# Patient Record
Sex: MALE | Race: WHITE | Employment: OTHER | URBAN - METROPOLITAN AREA
[De-identification: names, ages, dates, MRNs, and addresses within clinical notes are randomized per-mention and may not be internally consistent; named-entity substitution may affect disease eponyms.]

---

## 2018-05-11 ENCOUNTER — HOSPITAL ENCOUNTER (OUTPATIENT)
Dept: PHYSICAL THERAPY | Age: 73
Discharge: HOME | End: 2018-05-11
Payer: COMMERCIAL

## 2018-05-11 PROCEDURE — 97110 THERAPEUTIC EXERCISES: CPT

## 2018-05-11 PROCEDURE — 97162 PT EVAL MOD COMPLEX 30 MIN: CPT

## 2018-05-11 NOTE — PROGRESS NOTES
PT DAILY TREATMENT NOTE -    Patient Name: Helena Nissen  OFAE:[id number]  : 1945  [x]  Patient  Verified  Payor: BLUE CROSS / Plan:  [facility] / Product Type: PPO /    In time:300  Out time:340  Total Treatment Time (min): 40  Visit #: 1 of 8    Treatment Area: Pain in right shoulder [M25.511]    SUBJECTIVE  Pain Level (0-10 scale): 5  Any medication changes, allergies to medications, adverse drug reactions, diagnosis change, or new procedure performed?: [x] No    [] Yes (see summary sheet for update)  Subjective functional status/changes:   [x] See Eval form in paper chart     OBJECTIVE      32 min [x]Eval                  []Re-Eval         8 min Therapeutic Exercise:  [x] See flow sheet :   Rationale: increase ROM, increase strength and improve coordination to improve the patients ability to perform ADLs. With   [] TE   [] TA   [] neuro   [] other: Patient Education: [x] Review HEP    [] Progressed/Changed HEP based on:   [] positioning   [] body mechanics   [] transfers   [] heat/ice application    [] other:           Pain Level (0-10 scale) post treatment: 5    ASSESSMENT:   [x]  See Evaluation          Goals:  Short Term Goals: To be accomplished in 1 weeks:  1. Establish HEP for ROM & Strengthening. Long Term Goals: To be accomplished in 4 weeks:  1. Patient will be independent with HEP for ROM & Strengthening. Eval Status: na  2. Pt will increase right Shoulder PROM flexion to 160 degrees to increase ease with ADLs. Eval Status:130 deg  3. Pt will increase right Shoulder AROM by flexion/IR behind the back to 150 degrees/behind the back thumb to C7 to increase ease with ADLs. Eval Status:119 deg flexion, IR to right hip  4. Pt will increase FOTO score to 65 points to demonstrate improved functional lift & carry.     Eval Status:FOTO: 55    PLAN      [x]  Continue plan of care           Phillip Delgado, PT 2018  4:00 PM

## 2018-05-11 NOTE — PROGRESS NOTES
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 Assessment/ key information: Patient is a 81 y.o.male presenting with Pain in right shoulder [M25.511]. Mr. Mel Leyden presents to initial PT evaluation with c/o worsening right shoulder pain begnining mid April of this year. He reports no exacerbating incident, but instead a gradual worsening of symptoms that impacts sleeping, reaching, lifting, and household activities. He has no pain with touch but is restricted with overhead, across body, and behind the back reaching. Cervical screen negative. RTC testing positive with some positive impingement signs as well. We will work to address ROM limitations with progression of shoulder stability activities as able. Patient will benefit from skilled PT services to address deficits and facilitate return to premorbid activity level and promote improved quality of life.        Evaluation Complexity History MEDIUM  Complexity : 1-2 comorbidities / personal factors will impact the outcome/ POC ; Examination MEDIUM Complexity : 3 Standardized tests and measures addressing body structure, function, activity limitation and / or participation in recreation  ;Presentation MEDIUM Complexity : Evolving with changing characteristics  ; Clinical Decision Making MEDIUM Complexity : FOTO score of 26-74  Overall Complexity Rating: MEDIUM  Problem List: pain affecting function, decrease ROM, decrease strength, edema affecting function, impaired gait/ balance, decrease ADL/ functional abilitiies, decrease activity tolerance, decrease flexibility/ joint mobility and decrease transfer abilities   Treatment Plan may include any combination of the following: Therapeutic exercise, Therapeutic activities, Neuromuscular re-education, Physical agent/modality, Gait/balance training, Manual therapy, Aquatic therapy, Patient education, Self Care training, Functional mobility training, Home safety training and Stair training  Patient / Family readiness to learn indicated by: asking questions, trying to perform skills and interest  Persons(s) to be included in education: patient (P)  Barriers to Learning/Limitations: None  Patient Goal (s): relieve pain.   Patient Self Reported Health Status: good  Rehabilitation Potential: good  Short Term Goals: To be accomplished in 1 weeks:  1. Establish HEP for ROM & Strengthening. Long Term Goals: To be accomplished in 4 weeks:  1. Patient will be independent with HEP for ROM & Strengthening. Eval Status: na  2. Pt will increase right Shoulder PROM flexion to 160 degrees to increase ease with ADLs. Eval Status:130 deg  3. Pt will increase right Shoulder AROM by flexion/IR behind the back to 150 degrees/behind the back thumb to C7 to increase ease with ADLs. Eval Status:119 deg flexion, IR to right hip  4. Pt will increase FOTO score to 65 points to demonstrate improved functional lift & carry. Eval Status:FOTO: 46    Frequency / Duration: Patient to be seen 2 times per week for 4 weeks.     Patient/ Caregiver education and instruction: Diagnosis, prognosis, self care, activity modification and exercises   [x]  Plan of care has been reviewed with NOAM Kahn, PT 5/11/2018 2:54 PM    ________________________________________________________________________    I certify that the above Therapy Services are being furnished while the patient is under my care. I agree with the treatment plan and certify that this therapy is necessary.     Physician's Signature:____________________  Date:____________Time: _________    Please sign and return to [facility]  [address] [place], [address] [phone] fax

## 2018-05-14 ENCOUNTER — HOSPITAL ENCOUNTER (OUTPATIENT)
Dept: PHYSICAL THERAPY | Age: 73
Discharge: HOME | End: 2018-05-14
Payer: COMMERCIAL

## 2018-05-14 PROCEDURE — 97110 THERAPEUTIC EXERCISES: CPT

## 2018-05-14 PROCEDURE — 97140 MANUAL THERAPY 1/> REGIONS: CPT

## 2018-05-14 NOTE — PROGRESS NOTES
PT DAILY TREATMENT NOTE     Patient Name: Lenin Beasley  MHYA:[id number]  : 1945  [x]  Patient  Verified  Payor: BLUE CROSS / Plan: [employer] [facility] / Product Type: PPO /    In time:335  Out time:409  Total Treatment Time (min): 34  Visit #: 2 of 8    Treatment Area: Pain in right shoulder [M25.511]    SUBJECTIVE  Pain Level (0-10 scale): 4  Any medication changes, allergies to medications, adverse drug reactions, diagnosis change, or new procedure performed?: [x] No    [] Yes (see summary sheet for update)  Subjective functional status/changes:   [] No changes reported  \"i had some pain with some of the exercises. \"    OBJECTIVE    Modality rationale: PD   Min Type Additional Details    [] Estim:  []Unatt       []IFC  []Premod                        []Other:  []w/ice   []w/heat  Position:  Location:    [] Estim: []Att    []TENS instruct  []NMES                    []Other:  []w/US   []w/ice   []w/heat  Position:  Location:    []  Traction: [] Cervical       []Lumbar                       [] Prone          []Supine                       []Intermittent   []Continuous Lbs:  [] before manual  [] after manual    []  Ultrasound: []Continuous   [] Pulsed                           []1MHz   []3MHz W/cm2:  Location:    []  Iontophoresis with dexamethasone         Location: [] Take home patch   [] In clinic    []  Ice     []  heat  []  Ice massage  []  Laser   []  Anodyne Position:  Location:    []  Laser with stim  []  Other:  Position:  Location:    []  Vasopneumatic Device Pressure:       [] lo [] med [] hi   Temperature: [] lo [] med [] hi   [] Skin assessment post-treatment:  []intact []redness- no adverse reaction    []redness - adverse reaction:       26 min Therapeutic Exercise:  [x] See flow sheet :   Rationale: increase ROM, increase strength, improve coordination, improve balance and increase proprioception to improve the patients ability to reach.      8 min Manual Therapy:  Gentle PROM right shoulder, s/l scap mobs   Rationale: decrease pain, increase ROM and increase tissue extensibility to improve ROM for ADLs. With   [] TE   [] TA   [] neuro   [] other: Patient Education: [x] Review HEP    [] Progressed/Changed HEP based on:   [] positioning   [] body mechanics   [] transfers   [] heat/ice application    [] other:      Other Objective/Functional Measures:      Pain Level (0-10 scale) post treatment: 2    ASSESSMENT/Changes in Function: Mr. Rob Arnold had some pain with pendulums so advised him to hold off on those for now. Also modified form with some HEP exercises to prevent exacerbation of pain. Stressed at length need to avoid painful ranges of motion. Patient will continue to benefit from skilled PT services to modify and progress therapeutic interventions, address functional mobility deficits, address ROM deficits, address strength deficits, analyze and address soft tissue restrictions, analyze and cue movement patterns, analyze and modify body mechanics/ergonomics, assess and modify postural abnormalities, address imbalance/dizziness and instruct in home and community integration to attain remaining goals. []  See Plan of Care  []  See progress note/recertification  []  See Discharge Summary         Progress towards goals / Updated goals:  Short Term Goals: To be accomplished in 1 weeks:  1. Establish HEP for ROM & Strengthening.     Long Term Goals: To be accomplished in 4 weeks:  1. Patient will be independent with HEP for ROM & Strengthening. Eval Status: na  2. Pt will increase right Shoulder PROM flexion to 160 degrees to increase ease with ADLs. Eval Status:130 deg  3. Pt will increase right Shoulder AROM by flexion/IR behind the back to 150 degrees/behind the back thumb to C7 to increase ease with ADLs. Eval Status:119 deg flexion, IR to right hip  4.  Pt will increase FOTO score to 65 points to demonstrate improved functional lift & carry.                          Eval Status:FOTO: 55    PLAN  []  Upgrade activities as tolerated     [x]  Continue plan of care  []  Update interventions per flow sheet       []  Discharge due to:_  []  Other:_      Jamar Hammer, PT 5/14/2018  3:48 PM    Future Appointments  Date Time Provider Verena Chapman   5/16/2018 2:00 PM [place],  [address]

## 2018-05-16 ENCOUNTER — HOSPITAL ENCOUNTER (OUTPATIENT)
Dept: PHYSICAL THERAPY | Age: 73
Discharge: HOME | End: 2018-05-16
Payer: COMMERCIAL

## 2018-05-16 PROCEDURE — 97110 THERAPEUTIC EXERCISES: CPT | Performed by: PHYSICAL THERAPIST

## 2018-05-16 PROCEDURE — 97140 MANUAL THERAPY 1/> REGIONS: CPT | Performed by: PHYSICAL THERAPIST

## 2018-05-16 NOTE — PROGRESS NOTES
PT DAILY TREATMENT NOTE     Patient Name: Grecia Sellers  Date:2018  : 1945  [x]  Patient  Verified  Payor: BLUE CROSS / Plan: [email or web address] [facility] [place] / Product Type: PPO /    In time:202  Out time:238  Total Treatment Time (min): 36  Visit #: 3 of 8    Treatment Area: Pain in right shoulder [M25.511]    SUBJECTIVE  Pain Level (0-10 scale): 7  Any medication changes, allergies to medications, adverse drug reactions, diagnosis change, or new procedure performed?: [x] No    [] Yes (see summary sheet for update)  Subjective functional status/changes:   [] No changes reported  \"I'm hurting today. I don't know why, there isn't a specific reason. \"    OBJECTIVE    28 min Therapeutic Exercise:  [] See flow sheet :   Rationale: increase ROM, increase strength, improve coordination and increase proprioception to improve the patients ability to tolerate R UE tasks with reduced symptoms. 8 min Manual Therapy:  PROM in all planes to R shoulder with gentle distraction and cues to reduce guarding   Rationale: decrease pain, increase ROM, increase tissue extensibility and increase postural awareness to improve performance of R UE tasks            With   [] TE   [] TA   [] neuro   [] other: Patient Education: [x] Review HEP    [] Progressed/Changed HEP based on:   [] positioning   [] body mechanics   [] transfers   [] heat/ice application    [] other:      Other Objective/Functional Measures: Pt requires increased education regarding pain-free performance of HEP. PT spent extra time reviewing HEP issued at day of evaluation, correcting form, and encouraging patient to remain in pain-free range instead of attempting to match the pictures of the exercises. Pain Level (0-10 scale) post treatment: 7    ASSESSMENT/Changes in Function: Pt declines modalities at end of therapy session today. Pt requires increased time on education regarding pain-free ROM during both therapy and HEP.   Pt with increased guarding noted during PROM; he requires cues to reduce muscle guarding and allow PROM. Patient will continue to benefit from skilled PT services to modify and progress therapeutic interventions, address functional mobility deficits, address ROM deficits, address strength deficits, analyze and address soft tissue restrictions, analyze and cue movement patterns and analyze and modify body mechanics/ergonomics to attain remaining goals. Progress towards goals / Updated goals:  Short Term Goals: To be accomplished in 1 weeks:  1. Establish HEP for ROM & Strengthening.      Long Term Goals: To be accomplished in 4 weeks:  1. Patient will be independent with HEP for ROM & Strengthening.                        Eval Status: na  2. Pt will increase right Shoulder PROM flexion to 160 degrees to increase ease with ADLs.                       Eval Status:130 deg  3.  Pt will increase right Shoulder AROM by flexion/IR behind the back to 150 degrees/behind the back thumb to C7 to increase ease with ADLs.                       Eval Status:119 deg flexion, IR to right hip  4. Pt will increase FOTO score to 65 points to demonstrate improved functional lift & carry.                         Eval Status:FOTO: 46    PLAN  [x]  Upgrade activities as tolerated     [x]  Continue plan of care  []  Update interventions per flow sheet       []  Discharge due to:_  []  Other:_      Nuria Devries, PT 5/16/2018  2:11 PM    No future appointments.

## 2018-05-23 ENCOUNTER — HOSPITAL ENCOUNTER (OUTPATIENT)
Dept: PHYSICAL THERAPY | Age: 73
Discharge: HOME | End: 2018-05-23
Payer: COMMERCIAL

## 2018-05-23 PROCEDURE — 97140 MANUAL THERAPY 1/> REGIONS: CPT

## 2018-05-23 PROCEDURE — 97110 THERAPEUTIC EXERCISES: CPT

## 2018-05-23 NOTE — PROGRESS NOTES
PT DAILY TREATMENT NOTE     Patient Name: Urszula Juarez  Date:2018  : 1945  [x]  Patient  Verified  Payor: BLUE CROSS / Plan: [employer] [facility] / Product Type: PPO /    In time:326  Out time:400  Total Treatment Time (min): 34  Visit #: 4 of 8    Treatment Area: Pain in right shoulder [M25.511]    SUBJECTIVE  Pain Level (0-10 scale): 4  Any medication changes, allergies to medications, adverse drug reactions, diagnosis change, or new procedure performed?: [x] No    [] Yes (see summary sheet for update)  Subjective functional status/changes:   [] No changes reported  \"My shoulder still bothers me. \"    OBJECTIVE    Modality rationale: patient declined   Min Type Additional Details    [] Estim:  []Unatt       []IFC  []Premod                        []Other:  []w/ice   []w/heat  Position:  Location:    [] Estim: []Att    []TENS instruct  []NMES                    []Other:  []w/US   []w/ice   []w/heat  Position:  Location:    []  Traction: [] Cervical       []Lumbar                       [] Prone          []Supine                       []Intermittent   []Continuous Lbs:  [] before manual  [] after manual    []  Ultrasound: []Continuous   [] Pulsed                           []1MHz   []3MHz W/cm2:  Location:    []  Iontophoresis with dexamethasone         Location: [] Take home patch   [] In clinic    []  Ice     []  heat  []  Ice massage  []  Laser   []  Anodyne Position:  Location:    []  Laser with stim  []  Other:  Position:  Location:    []  Vasopneumatic Device Pressure:       [] lo [] med [] hi   Temperature: [] lo [] med [] hi   [] Skin assessment post-treatment:  []intact []redness- no adverse reaction    []redness - adverse reaction:     26 min Therapeutic Exercise:  [x] See flow sheet :   Rationale: increase ROM and increase strength to improve the patients ability to perform ADLs    8 min Manual Therapy:  Sidelying scap mobs to right scapula, STM to Right UT and medial border of scapula, PROM to right GHJ   Rationale: decrease pain, increase ROM, increase tissue extensibility, decrease trigger points and increase postural awareness to improve mobility and reaching        With   [x] TE   [] TA   [] neuro   [] other: Patient Education: [x] Review HEP    [] Progressed/Changed HEP based on:   [x] positioning   [x] body mechanics   [] transfers   [] heat/ice application    [] other:      Other Objective/Functional Measures:   FOTO 55     Pain Level (0-10 scale) post treatment: 3    ASSESSMENT/Changes in Function: Pt is making progress with his passive motion, but remains painful at end range. He is challenged with abduction movements. Patient will continue to benefit from skilled PT services to modify and progress therapeutic interventions, address functional mobility deficits, address ROM deficits, address strength deficits, analyze and address soft tissue restrictions, analyze and cue movement patterns, analyze and modify body mechanics/ergonomics, assess and modify postural abnormalities, address imbalance/dizziness and instruct in home and community integration to attain remaining goals. [x]  See Plan of Care  []  See progress note/recertification  []  See Discharge Summary         Progress towards goals / Updated goals:  Short Term Goals: To be accomplished in 1 weeks:  1. Establish HEP for ROM & Strengthening.     MET  Long Term Goals: To be accomplished in 4 weeks:  1. Patient will be independent with HEP for ROM & Strengthening. Eval Status: na   Reports compliance  2. Pt will increase right Shoulder PROM flexion to 160 degrees to increase ease with ADLs. Eval Status:130 deg   Improving with passive motion  3.  Pt will increase right Shoulder AROM by flexion/IR behind the back to 150 degrees/behind the back thumb to C7 to increase ease with ADLs. Eval Status:119 deg flexion, IR to right hip   Challenged with abduction  4.  Pt will increase FOTO score to 65 points to demonstrate improved functional lift & carry.     Eval Status:FOTO: 86   87    ZQWN  []  Upgrade activities as tolerated     [x]  Continue plan of care  []  Update interventions per flow sheet       []  Discharge due to:_  []  Other:_      Sarah Sr, PTA, CSCS 5/23/2018  4:10 PM    Future Appointments  Date Time Provider Verena Chapman   5/23/2018 3:30 PM Sarah Sr, PTA [facility]   5/24/2018 9:30 AM Killiane Orin, PTA [facility]   5/30/2018 9:30 AM Altonyce Linea, PTA [facility]   6/1/2018 9:30 AM Gianna Guthrie, PT [facility]

## 2018-05-24 ENCOUNTER — HOSPITAL ENCOUNTER (OUTPATIENT)
Dept: PHYSICAL THERAPY | Age: 73
Discharge: HOME | End: 2018-05-24
Payer: COMMERCIAL

## 2018-05-24 PROCEDURE — 97140 MANUAL THERAPY 1/> REGIONS: CPT

## 2018-05-24 PROCEDURE — 97110 THERAPEUTIC EXERCISES: CPT

## 2018-05-24 NOTE — PROGRESS NOTES
PT DAILY TREATMENT NOTE     Patient Name: Blaze Rogers  Date:2018  : 1945  [x]  Patient  Verified  Payor: BLUE CROSS / Plan: [employer] / Product Type: PPO /    In time:930  Out time:1008  Total Treatment Time (min): 38  Visit #: 5 of 8    Treatment Area: Pain in right shoulder [M25.511]    SUBJECTIVE  Pain Level (0-10 scale): 3  Any medication changes, allergies to medications, adverse drug reactions, diagnosis change, or new procedure performed?: [x] No    [] Yes (see summary sheet for update)  Subjective functional status/changes:   [] No changes reported  \"I feel about the same. \"    OBJECTIVE    Modality rationale: patient declined   Min Type Additional Details    [] Estim:  []Unatt       []IFC  []Premod                        []Other:  []w/ice   []w/heat  Position:  Location:    [] Estim: []Att    []TENS instruct  []NMES                    []Other:  []w/US   []w/ice   []w/heat  Position:  Location:    []  Traction: [] Cervical       []Lumbar                       [] Prone          []Supine                       []Intermittent   []Continuous Lbs:  [] before manual  [] after manual    []  Ultrasound: []Continuous   [] Pulsed                           []1MHz   []3MHz W/cm2:  Location:    []  Iontophoresis with dexamethasone         Location: [] Take home patch   [] In clinic    []  Ice     []  heat  []  Ice massage  []  Laser   []  Anodyne Position:  Location:    []  Laser with stim  []  Other:  Position:  Location:    []  Vasopneumatic Device Pressure:       [] lo [] med [] hi   Temperature: [] lo [] med [] hi   [] Skin assessment post-treatment:  []intact []redness- no adverse reaction    []redness - adverse reaction:     30 min Therapeutic Exercise:  [x] See flow sheet :   Rationale: increase ROM and increase strength to improve the patients ability to perform ADLs    8 min Manual Therapy:  Sidelying scap mobs to right scapula, STM to Right UT and medial border of scapula, PROM to right GHJ   Rationale: decrease pain, increase ROM, increase tissue extensibility, decrease trigger points and increase postural awareness to improve mobility and reaching        With   [x] TE   [] TA   [] neuro   [] other: Patient Education: [x] Review HEP    [] Progressed/Changed HEP based on:   [x] positioning   [x] body mechanics   [] transfers   [] heat/ice application    [] other:      Other Objective/Functional Measures:      Pain Level (0-10 scale) post treatment: 3    ASSESSMENT/Changes in Function: Pt is making progress with his motion, but remains painful with overhead activities. Good scapular mobility. Patient will continue to benefit from skilled PT services to modify and progress therapeutic interventions, address functional mobility deficits, address ROM deficits, address strength deficits, analyze and address soft tissue restrictions, analyze and cue movement patterns, analyze and modify body mechanics/ergonomics, assess and modify postural abnormalities, address imbalance/dizziness and instruct in home and community integration to attain remaining goals. [x]  See Plan of Care  []  See progress note/recertification  []  See Discharge Summary         Progress towards goals / Updated goals:  Short Term Goals: To be accomplished in 1 weeks:  1. Establish HEP for ROM & Strengthening. MET  Long Term Goals: To be accomplished in 4 weeks:  1. Patient will be independent with HEP for ROM & Strengthening. Eval Status: na   Reports compliance  2. Pt will increase right Shoulder PROM flexion to 160 degrees to increase ease with ADLs. Eval Status:130 deg   Improving with passive motion  3.  Pt will increase right Shoulder AROM by flexion/IR behind the back to 150 degrees/behind the back thumb to C7 to increase ease with ADLs. Eval Status:119 deg flexion, IR to right hip   Challenged with abduction  4.  Pt will increase FOTO score to 65 points to demonstrate improved functional lift & carry.     Eval Status:FOTO: 50   99    WXEU  []  Upgrade activities as tolerated     [x]  Continue plan of care  []  Update interventions per flow sheet       []  Discharge due to:_  []  Other:_      Marcelino Velazquez PTA, [facility] 5/24/2018  10:11 AM    Future Appointments  Date Time Provider Verena Chapman   5/30/2018 9:30 AM [facility]   6/1/2018 9:30 AM [facility]ovanny Pires [facility]

## 2018-05-30 ENCOUNTER — HOSPITAL ENCOUNTER (OUTPATIENT)
Dept: PHYSICAL THERAPY | Age: 73
Discharge: HOME | End: 2018-05-30
Payer: COMMERCIAL

## 2018-05-30 PROCEDURE — 97110 THERAPEUTIC EXERCISES: CPT

## 2018-05-30 PROCEDURE — 97140 MANUAL THERAPY 1/> REGIONS: CPT

## 2018-05-30 NOTE — PROGRESS NOTES
PT DAILY TREATMENT NOTE     Patient Name: Romeo Fonseca  Date:2018  : 1945  [x]  Patient  Verified  Payor: BLUE CROSS / Plan: [facility] / Product Type: PPO /    In time:930  Out time:1003  Total Treatment Time (min): 33  Visit #: 6 of 8    Treatment Area: Pain in right shoulder [M25.511]    SUBJECTIVE  Pain Level (0-10 scale): 5  Any medication changes, allergies to medications, adverse drug reactions, diagnosis change, or new procedure performed?: [x] No    [] Yes (see summary sheet for update)  Subjective functional status/changes:   [] No changes reported  \"It hurts like a headache type of pain. \"    OBJECTIVE    Modality rationale: patient declined   Min Type Additional Details    [] Estim:  []Unatt       []IFC  []Premod                        []Other:  []w/ice   []w/heat  Position:  Location:    [] Estim: []Att    []TENS instruct  []NMES                    []Other:  []w/US   []w/ice   []w/heat  Position:  Location:    []  Traction: [] Cervical       []Lumbar                       [] Prone          []Supine                       []Intermittent   []Continuous Lbs:  [] before manual  [] after manual    []  Ultrasound: []Continuous   [] Pulsed                           []1MHz   []3MHz W/cm2:  Location:    []  Iontophoresis with dexamethasone         Location: [] Take home patch   [] In clinic    []  Ice     []  heat  []  Ice massage  []  Laser   []  Anodyne Position:  Location:    []  Laser with stim  []  Other:  Position:  Location:    []  Vasopneumatic Device Pressure:       [] lo [] med [] hi   Temperature: [] lo [] med [] hi   [] Skin assessment post-treatment:  []intact []redness- no adverse reaction    []redness - adverse reaction:     25 min Therapeutic Exercise:  [x] See flow sheet :   Rationale: increase ROM and increase strength to improve the patients ability to perform ADLs    8 min Manual Therapy:  Sidelying scap mobs to right scapula, STM to Right UT and medial border of scapula, PROM to right GHJ   Rationale: decrease pain, increase ROM, increase tissue extensibility, decrease trigger points and increase postural awareness to improve mobility and reaching        With   [x] TE   [] TA   [x] neuro   [] other: Patient Education: [x] Review HEP    [] Progressed/Changed HEP based on:   [x] positioning   [x] body mechanics   [] transfers   [] heat/ice application    [] other:      Other Objective/Functional Measures:      Pain Level (0-10 scale) post treatment: 3-4    ASSESSMENT/Changes in Function: Pt is progressing with his motion, but his pain continues to fluctuate currently. Patient will continue to benefit from skilled PT services to modify and progress therapeutic interventions, address functional mobility deficits, address ROM deficits, address strength deficits, analyze and address soft tissue restrictions, analyze and cue movement patterns, analyze and modify body mechanics/ergonomics, assess and modify postural abnormalities, address imbalance/dizziness and instruct in home and community integration to attain remaining goals. [x]  See Plan of Care  []  See progress note/recertification  []  See Discharge Summary         Progress towards goals / Updated goals:  Short Term Goals: To be accomplished in 1 weeks:  1. Establish HEP for ROM & Strengthening. MET  Long Term Goals: To be accomplished in 4 weeks:  1. Patient will be independent with HEP for ROM & Strengthening. Eval Status: na   Reports compliance  2. Pt will increase right Shoulder PROM flexion to 160 degrees to increase ease with ADLs. Eval Status:130 deg   Improving with passive motion  3.  Pt will increase right Shoulder AROM by flexion/IR behind the back to 150 degrees/behind the back thumb to C7 to increase ease with ADLs. Eval Status:119 deg flexion, IR to right hip   Challenged with abduction  4. Pt will increase FOTO score to 65 points to demonstrate improved functional lift & carry. Eval Status:FOTO: 19   94    OJSS  []  Upgrade activities as tolerated     [x]  Continue plan of care  []  Update interventions per flow sheet       []  Discharge due to:_  []  Other:_      Peggy Davis PTA, [facility] 5/30/2018  10:21 AM    Future Appointments  Date Time Provider Verena Chapman   6/1/2018 9:30 AM Mickey Hensley PT [facility]   6/5/2018 9:30 AM Mickey Hensley PT [facility]   6/7/2018 9:30 AM Peggy Davis PTA [facility]

## 2018-06-05 ENCOUNTER — HOSPITAL ENCOUNTER (OUTPATIENT)
Dept: PHYSICAL THERAPY | Age: 73
Discharge: HOME | End: 2018-06-05
Payer: COMMERCIAL

## 2018-06-05 PROCEDURE — 97110 THERAPEUTIC EXERCISES: CPT | Performed by: PHYSICAL THERAPIST

## 2018-06-05 PROCEDURE — 97140 MANUAL THERAPY 1/> REGIONS: CPT | Performed by: PHYSICAL THERAPIST

## 2018-06-05 NOTE — PROGRESS NOTES
PT DAILY TREATMENT NOTE     Patient Name: Shadia Chawla  Date:2018  : 1945  [x]  Patient  Verified  Payor: BLUE CROSS / Plan: [employer] [facility] / Product Type: PPO /    In time:928  Out time:1005  Total Treatment Time (min): 37  Visit #: 7 of 8    Treatment Area: Pain in right shoulder [M25.511]    SUBJECTIVE  Pain Level (0-10 scale): 6  Any medication changes, allergies to medications, adverse drug reactions, diagnosis change, or new procedure performed?: [x] No    [] Yes (see summary sheet for update)  Subjective functional status/changes:   [] No changes reported  \"This is the worst I've felt in a while    OBJECTIVE    29 min Therapeutic Exercise:  [] See flow sheet :   Rationale: increase ROM, increase strength, improve coordination and increase proprioception to improve the patients ability to tolerate R shoulder activity with reduced pain. 8 min Manual Therapy:  PROM in supine in all planes to R shoulder; S/L scapulothoracic joint mobilizations   Rationale: decrease pain, increase ROM, increase tissue extensibility and decrease trigger points to improve tolerance of R UE activity. With   [] TE   [] TA   [] neuro   [] other: Patient Education: [x] Review HEP    [] Progressed/Changed HEP based on:   [] positioning   [] body mechanics   [] transfers   [] heat/ice application    [] other:      Other Objective/Functional Measures: Pt notes pain mid-range today during PROM/AAROM tasks. Pt given cues for positioning during standing shoulder flexion tasks and TRX rows. Pain Level (0-10 scale) post treatment: 5    ASSESSMENT/Changes in Function: Pt reports that his shoulder is slightly more sore today; he is not sure why, but notes using an [employer] patch last night. Pt notes pain mid-range in the shoulder during PROM/AAROM tasks. Pt tolerates session with slight reduction in pain at end of therapy; he denies need for modalities.     Patient will continue to benefit from skilled PT services to modify and progress therapeutic interventions, address functional mobility deficits, address ROM deficits, address strength deficits, analyze and address soft tissue restrictions, analyze and cue movement patterns and analyze and modify body mechanics/ergonomics to attain remaining goals. Progress towards goals / Updated goals:  Short Term Goals: To be accomplished in 1 weeks:  1. Establish HEP for ROM & Strengthening. MET  Long Term Goals: To be accomplished in 4 weeks:  1. Patient will be independent with HEP for ROM & Strengthening. Eval Status: na                        Reports compliance  2. Pt will increase right Shoulder PROM flexion to 160 degrees to increase ease with ADLs. Eval Status:130 deg                        Improving with passive motion  3.  Pt will increase right Shoulder AROM by flexion/IR behind the back to 150 degrees/behind the back thumb to C7 to increase ease with ADLs. Eval Status:119 deg flexion, IR to right hip                        Challenged with abduction  4. Pt will increase FOTO score to 65 points to demonstrate improved functional lift & carry.                          Eval Status:FOTO: 55                        55    PLAN  [x]  Upgrade activities as tolerated     [x]  Continue plan of care  []  Update interventions per flow sheet       []  Discharge due to:_  []  Other:_      Gianna Guthrie, PT 6/5/2018  9:30 AM    Future Appointments  Date Time Provider Verena Chapman   6/7/2018 9:30 AM Sarah Sr PTA [facility]

## 2018-06-07 ENCOUNTER — HOSPITAL ENCOUNTER (OUTPATIENT)
Dept: PHYSICAL THERAPY | Age: 73
Discharge: HOME | End: 2018-06-07
Payer: COMMERCIAL

## 2018-06-07 PROCEDURE — 97110 THERAPEUTIC EXERCISES: CPT

## 2018-06-07 NOTE — PROGRESS NOTES
PT DAILY TREATMENT NOTE     Patient Name: Nathan Durham  Date:2018  : 1945  [x]  Patient  Verified  Payor: BLUE CROSS / Plan: [employer] [facility] / Product Type: PPO /    In time:910  Out time:943  Total Treatment Time (min): 33  Total Timed Codes (min): 33  1:1 Treatment Time (min): 33  Visit #: 8 of 8    Treatment Area: Pain in right shoulder [M25.511]    SUBJECTIVE  Pain Level (0-10 scale): 5  Any medication changes, allergies to medications, adverse drug reactions, diagnosis change, or new procedure performed?: [x] No    [] Yes (see summary sheet for update)  Subjective functional status/changes:   [] No changes reported  \"The pain has been rough since Monday. \"    OBJECTIVE    Modality rationale: patient declined   Min Type Additional Details    [] Estim:  []Unatt       []IFC  []Premod                        []Other:  []w/ice   []w/heat  Position:  Location:    [] Estim: []Att    []TENS instruct  []NMES                    []Other:  []w/US   []w/ice   []w/heat  Position:  Location:    []  Traction: [] Cervical       []Lumbar                       [] Prone          []Supine                       []Intermittent   []Continuous Lbs:  [] before manual  [] after manual    []  Ultrasound: []Continuous   [] Pulsed                           []1MHz   []3MHz W/cm2:  Location:    []  Iontophoresis with dexamethasone         Location: [] Take home patch   [] In clinic    []  Ice     []  heat  []  Ice massage  []  Laser   []  Anodyne Position:  Location:    []  Laser with stim  []  Other:  Position:  Location:    []  Vasopneumatic Device Pressure:       [] lo [] med [] hi   Temperature: [] lo [] med [] hi   [] Skin assessment post-treatment:  []intact []redness- no adverse reaction    []redness - adverse reaction:     33 min Therapeutic Exercise:  [x] See flow sheet :   Rationale: increase ROM and increase strength to improve the patients ability to perform ADLs        With   [x] TE   [] TA   [x] neuro   [] other: Patient Education: [x] Review HEP    [] Progressed/Changed HEP based on:   [x] positioning   [x] body mechanics   [] transfers   [] heat/ice application    [] other:      Other Objective/Functional Measures:   FOTO 59  PROM flexion right shoulder 130 deg  AROM flexion right shoulder 120 deg     Pain Level (0-10 scale) post treatment: 4    ASSESSMENT/Changes in Function: Mr. Kin Belle has been a pleasure to treat and reports 0% improvement since beginning therapy. Pt is making minimal progress with his pain relief and ROM. He reports pain at rest, lifting, sleeping, and is limited with reaching across his body and behind his self. Able to IR to his right hip. We are discharging to an updated HEP as patient doesn't feel as though he is improving and wishes to follow up with his MD.    Patient will continue to benefit from skilled PT services to modify and progress therapeutic interventions, address functional mobility deficits, address ROM deficits, address strength deficits, analyze and address soft tissue restrictions, analyze and cue movement patterns, analyze and modify body mechanics/ergonomics, assess and modify postural abnormalities, address imbalance/dizziness and instruct in home and community integration to attain remaining goals. []  See Plan of Care  []  See progress note/recertification  [x]  See Discharge Summary         Progress towards goals / Updated goals:  Short Term Goals: To be accomplished in 1 weeks:  1. Establish HEP for ROM & Strengthening. MET  Long Term Goals: To be accomplished in 4 weeks:  1. Patient will be independent with HEP for ROM & Strengthening. Eval Status: na   MET  2. Pt will increase right Shoulder PROM flexion to 160 degrees to increase ease with ADLs. Eval Status:130 deg   MET; 130 deg  3.  Pt will increase right Shoulder AROM by flexion/IR behind the back to 150 degrees/behind the back thumb to C7 to increase ease with ADLs.    Eval Status:119 deg flexion, IR to right hip   NOT MET; 120 deg flexion, IR to right hip  4. Pt will increase FOTO score to 65 points to demonstrate improved functional lift & carry. Eval Status:FOTO: 46   NOT MET; 59    Functional Gains: none to report  Functional Deficits: lifting, full ROM, reaching across body, reaching behind self, pain at rest, sleeping  % improvement: 0%  Pain   Average: 5/10       Best: 4/10     Worst: 9/10  Patient Goal: \"get rid of the pain. \"    PLAN  []  Upgrade activities as tolerated     []  Continue plan of care  []  Update interventions per flow sheet       [x]  Discharge due to: PROGRAM COMPLETE  []  Other:_      Peggy Davis PTA, [facility] 6/7/2018  9:51 AM    Future Appointments  Date Time Provider Verena Chapman   6/7/2018 9:30 AM Peggy Davis PTA [facility]   6/12/2018 9:00 AM Snader Hwang, JOEL [facility]   6/14/2018 9:00 AM Sander Hwang PT [facility]

## 2018-06-12 NOTE — PROGRESS NOTES
[facility] [place] [address]  [address], [address] [phone] fax    Discharge Summary  Patient name: Maliha Cody Start of Care: 2018   Referral source: Shira Wilcox MD : 1945                         Medical Diagnosis: Pain in right shoulder [M25.511] Onset Date:                         Treatment Diagnosis: right shoulder pain   Prior Hospitalization: see medical history Provider#: [id number]   Medications: Verified on Patient summary List    Comorbidities: neuropathy in B hands/feet, OA, HTN   Prior Level of Function: retired, functionally independent    Visits from Start of Care: 8    Missed Visits: 0    Reporting Period : 18 to 18    [address] [place] [address] Avenue be accomplished in 1 weeks:  1. Establish HEP for ROM & Strengthening. MET  Long Term Goals: To be accomplished in 4 weeks:  1. Patient will be independent with HEP for ROM & Strengthening. Eval Status: na                        MET  2. Pt will increase right Shoulder PROM flexion to 160 degrees to increase ease with ADLs. Eval Status:130 deg                        MET; 160 deg  3.  Pt will increase right Shoulder AROM by flexion/IR behind the back to 150 degrees/behind the back thumb to C7 to increase ease with ADLs. Eval Status:119 deg flexion, IR to right hip                        NOT MET; 120 deg flexion, IR to right hip  4. Pt will increase FOTO score to 65 points to demonstrate improved functional lift & carry.                          Eval Status:FOTO: 46                        NOT MET; 59     Functional Gains: none to report  Functional Deficits: lifting, full ROM, reaching across body, reaching behind self, pain at rest, sleeping  % improvement: 0%  Pain   Average: 5/10                            Best: 4/10                          Worst: 9/10  Patient Goal: \"get rid of the pain. \"     Assessment/Summary of care: Mr. Alfredo Daly has made minimal progress with his pain relief and ROM in therapy over the past 4 weeks. He reports pain at rest, lifting, sleeping, and is limited with reaching across his body and behind his self.   As pain continues to limit him functionally and progress towards goals has been limited, recommend discharge from outpatient PT services with f/u with MD.     RECOMMENDATIONS:  [x]Discontinue therapy: []Patient has reached or is progressing toward set goals      []Patient is non-compliant or has abdicated      [x]Due to lack of appreciable progress towards set goals    Sha Zeng, PT 6/12/2018 2:25 PM

## 2018-08-07 ENCOUNTER — HOSPITAL ENCOUNTER (OUTPATIENT)
Dept: PHYSICAL THERAPY | Age: 73
Discharge: HOME | End: 2018-08-07
Payer: COMMERCIAL

## 2018-08-07 PROCEDURE — 97161 PT EVAL LOW COMPLEX 20 MIN: CPT | Performed by: PHYSICAL THERAPIST

## 2018-08-07 PROCEDURE — 97140 MANUAL THERAPY 1/> REGIONS: CPT | Performed by: PHYSICAL THERAPIST

## 2018-08-07 PROCEDURE — 97110 THERAPEUTIC EXERCISES: CPT | Performed by: PHYSICAL THERAPIST

## 2018-08-07 NOTE — PROGRESS NOTES
PT DAILY TREATMENT NOTE     Patient Name: Sherwin Bush  WLVL:[id number]  : 1945  [x]  Patient  Verified  Payor: BLUE CROSS / Plan: [facility] / Product Type: PPO /    In time:  Out time:1155  Total Treatment Time (min): 44  Visit #: 1 of 10    Treatment Area: Adhesive capsulitis of right shoulder [M75.01]    SUBJECTIVE  Pain Level (0-10 scale): 3  Any medication changes, allergies to medications, adverse drug reactions, diagnosis change, or new procedure performed?: [x] No    [] Yes (see summary sheet for update)  Subjective functional status/changes:   [] No changes reported  See eval.    OBJECTIVE    10 min [x]Eval                  []Re-Eval       34 min Therapeutic Exercise:  [] See flow sheet :   Rationale: increase ROM and increase proprioception to improve the patients ability to tolerate daily tasks with reduced R shoulder pain and improved mobility. With   [x] TE   [] TA   [] neuro   [] other: Patient Education: [x] Review HEP    [] Progressed/Changed HEP based on:   [] positioning   [] body mechanics   [] transfers   [] heat/ice application    [] other:      Other Objective/Functional Measures: See eval.     Pain Level (0-10 scale) post treatment: 2    ASSESSMENT/Changes in Function:   [x]  See Plan of Care    Progress towards goals / Updated goals:  Short Term Goals: To be accomplished in 2 weeks:  1. Pt to report [place] with HEP. Eval status: HEP issued and reviewed physically/verbally with pt    Long Term Goals: To be accomplished in 4 weeks:  1. Pt to report score of 72 on FOTO, indicating improved tolerance to activity and reduced pain. Eval status: 63 on initial FOTO  2.   Pt to demonstrate full R shoulder AROM/PROM at 160 degrees flexion/abduction, 75 degrees ER (90/90), able to reach C7 with IR behind the back  Eval status: R shoulder AROM 110 degrees flexion, 116 degrees abduction, 68 degrees ER (90/90) with substitutions, able to reach to sacrum with behind the back IR  3. Pt to be able to perform overhead and upper body hygiene/dressing tasks without increased c/o pain or difficulty.   Eval status: limited by pain and reduced ROM    PLAN  []  Upgrade activities as tolerated     [x]  Continue plan of care  []  Update interventions per flow sheet       []  Discharge due to:_  []  Other:_      Ryan Klein, PT 8/7/2018  12:19 PM    Future Appointments  Date Time Provider Verena Chapman   8/14/2018 2:00 PM Ailyn Hatch [facility]   8/16/2018 2:30 PM Arnulfo Torres PTA [facility]   8/21/2018 2:00 PM JOEL Sims [facility]   8/23/2018 2:30 PM Arnulfo Torres PTA [facility]   8/28/2018 2:30 PM Ryan Klein PT [facility]   8/30/2018 2:00 PM Arnulfo Torres PTA [facility]

## 2018-08-07 NOTE — PROGRESS NOTES
[facility] - [place] [address]  [address], [address] [phone] fax    Plan of Care/ Statement of Necessity for Physical Therapy Services           Patient name: Guanakito Stevenson Start of Care: 2018     Referral source: Kris Green MD : 1945                         Medical Diagnosis: Pain in right shoulder [M25.511] Onset Date:mid 2018                         Treatment Diagnosis: right shoulder pain   Prior Hospitalization: see medical history Provider#: [id number]   Medications: Verified on Patient summary List    Comorbidities: neuropathy in B hands/feet, OA, HTN   Prior Level of Function: retired, functionally independent  [address] and following information is based on the information from the initial evaluation. Assessment/ key information: Patient is a 81 y.o.male presenting with Pain in right shoulder [M25.511]. Pt previously attended therapy in May 2018 with c/o worsening R shoulder pain that is now slightly improving at this evaluation. He notes that his symptoms began in 2018 without specific cause. presents to initial PT evaluation with c/o worsening right shoulder pain begnining mid April of this year. Pt notes difficulty with dressing and reaching behind the back. He notes that he does have low level pain at rest as well. Pt demonstrates reduced AROM/PROM of the R shoulder, but is able to exhibit 5/5 strength in testing positions with MMT. Pt would benefit from skilled PT intervention to address the above limitations and return him to full PLOF.   Evaluation Complexity History MEDIUM  Complexity : 1-2 comorbidities / personal factors will impact the outcome/ POC ; Examination MEDIUM Complexity : 3 Standardized tests and measures addressing body structure, function, activity limitation and / or participation in recreation  ;Presentation MEDIUM Complexity : Evolving with changing characteristics ;Clinical Decision Making MEDIUM Complexity : FOTO score of 26-74  Overall Complexity Rating: LOW  Problem List: pain affecting function, decrease ROM, decrease strength, edema affecting function, impaired gait/ balance, decrease ADL/ functional abilitiies, decrease activity tolerance, decrease flexibility/ joint mobility and decrease transfer abilities                         Treatment Plan may include any combination of the following: Therapeutic exercise, Therapeutic activities, Neuromuscular re-education, Physical agent/modality, Gait/balance training, Manual therapy, Aquatic therapy, Patient education, Self Care training, Functional mobility training, Home safety training and Stair training  Patient / Family readiness to learn indicated by: asking questions, trying to perform skills and interest  Persons(s) to be included in education: patient (P)  Barriers to Learning/Limitations: None  Patient Goal (s): get rid of pain  Patient Self Reported Health Status: good  Rehabilitation Potential: good  Short Term Goals: To be accomplished in 2 weeks:  1. Pt to report [place] with HEP. Eval status: HEP issued and reviewed physically/verbally with pt    Long Term Goals: To be accomplished in 4 weeks:  1. Pt to report score of 72 on FOTO, indicating improved tolerance to activity and reduced pain. Eval status: 63 on initial FOTO  2. Pt to demonstrate full R shoulder AROM/PROM at 160 degrees flexion/abduction, 75 degrees ER (90/90), able to reach C7 with IR behind the back  Eval status: R shoulder AROM 110 degrees flexion, 116 degrees abduction, 68 degrees ER (90/90) with substitutions, able to reach to sacrum with behind the back IR  3. Pt to be able to perform overhead and upper body hygiene/dressing tasks without increased c/o pain or difficulty. Eval status: limited by pain and reduced ROM  Frequency / Duration: Patient to be seen 2 times per week for 10 treatments.     Patient/ Caregiver education and instruction: Diagnosis, prognosis, activity modification and exercises   [x]  Plan of care has been reviewed with NOAM Dykes, JOEL 8/7/2018 11:18 AM    ________________________________________________________________________    I certify that the above Therapy Services are being furnished while the patient is under my care. I agree with the treatment plan and certify that this therapy is necessary.     Physician's Signature:____________Date:_________TIME:________    ** Signature, Date and Time must be completed for valid certification **    Please sign and return to [facility] [place] [address]  [address] [place], [address] [phone] fax

## 2018-08-09 ENCOUNTER — APPOINTMENT (OUTPATIENT)
Dept: PHYSICAL THERAPY | Age: 73
End: 2018-08-09
Payer: COMMERCIAL

## 2018-08-14 ENCOUNTER — HOSPITAL ENCOUNTER (OUTPATIENT)
Dept: PHYSICAL THERAPY | Age: 73
Discharge: HOME | End: 2018-08-14
Payer: COMMERCIAL

## 2018-08-14 PROCEDURE — 97112 NEUROMUSCULAR REEDUCATION: CPT | Performed by: PHYSICAL THERAPIST

## 2018-08-14 PROCEDURE — 97110 THERAPEUTIC EXERCISES: CPT | Performed by: PHYSICAL THERAPIST

## 2018-08-14 PROCEDURE — 97140 MANUAL THERAPY 1/> REGIONS: CPT | Performed by: PHYSICAL THERAPIST

## 2018-08-14 NOTE — PROGRESS NOTES
PT DAILY TREATMENT NOTE     Patient Name: Jayro Nicolas  Date:2018  : 1945  [x]  Patient  Verified  Payor: BLUE CROSS / Plan: [employer] [facility] [place] / Product Type: PPO /    In time:203  Out time:256  Total Treatment Time (min): 53  Visit #: 2 of 10    Treatment Area: Adhesive capsulitis of right shoulder [M75.01]    SUBJECTIVE  Pain Level (0-10 scale): 3  Any medication changes, allergies to medications, adverse drug reactions, diagnosis change, or new procedure performed?: [x] No    [] Yes (see summary sheet for update)  Subjective functional status/changes:   [] No changes reported  \"I was sore this weekend. I'm not sure why. \"    OBJECTIVE    35 min Therapeutic Exercise:  [x] See flow sheet :   Rationale: increase ROM, increase strength, improve coordination and increase proprioception to improve the patients ability to tolerate daily activities with improved mobility/stability and reduced R shoulder pain. 10 min Neuromuscular Re-education:  [x]  See flow sheet :   Rationale: increase ROM, increase strength, improve coordination and increase proprioception  to improve the patients ability to tolerate daily activities with improved mobility/stability and reduced R shoulder pain. 8 min Manual Therapy:  PROM in all planes to endrange, gentle distraction to shoulder joint. Rationale: decrease pain, increase ROM and increase tissue extensibility to tolerate daily activities with improved mobility/stability and reduced R shoulder pain. With   [x] TE   [] TA   [x] neuro   [] other: Patient Education: [x] Review HEP    [] Progressed/Changed HEP based on:   [] positioning   [] body mechanics   [] transfers   [] heat/ice application    [] other:      Other Objective/Functional Measures: Pt requires visual and verbal cues for initiation of POC/new exercises.      Pain Level (0-10 scale) post treatment: 2    ASSESSMENT/Changes in Function: Pt notes slight reduction in pain after treatment today. He notes benefit from exercises and PROM tasks today and declines modalities post treatment. Patient will continue to benefit from skilled PT services to modify and progress therapeutic interventions, address functional mobility deficits, address ROM deficits, address strength deficits, analyze and address soft tissue restrictions, analyze and cue movement patterns, analyze and modify body mechanics/ergonomics and assess and modify postural abnormalities to attain remaining goals. Progress towards goals / Updated goals:  Short Term Goals: To be accomplished in 2 weeks:  1. Pt to report [place] with HEP. Eval status: HEP issued and reviewed physically/verbally with pt     Long Term Goals: To be accomplished in 4 weeks:  1. Pt to report score of 72 on FOTO, indicating improved tolerance to activity and reduced pain. Eval status: 63 on initial FOTO  2. Pt to demonstrate full R shoulder AROM/PROM at 160 degrees flexion/abduction, 75 degrees ER (90/90), able to reach C7 with IR behind the back  Eval status: R shoulder AROM 110 degrees flexion, 116 degrees abduction, 68 degrees ER (90/90) with substitutions, able to reach to sacrum with behind the back IR  3. Pt to be able to perform overhead and upper body hygiene/dressing tasks without increased c/o pain or difficulty.   Eval status: limited by pain and reduced ROM    PLAN  []  Upgrade activities as tolerated     [x]  Continue plan of care  []  Update interventions per flow sheet       []  Discharge due to:_  []  Other:_      Hernan Dwyer, PT 8/14/2018  3:00 PM    Future Appointments  Date Time Provider Verena Chapman   8/21/2018 2:00 PM [facility]   8/23/2018 2:30 PM [facility]   8/28/2018 2:30 PM [facility]   8/30/2018 2:00 PM [facility]

## 2018-08-16 ENCOUNTER — APPOINTMENT (OUTPATIENT)
Dept: PHYSICAL THERAPY | Age: 73
End: 2018-08-16
Payer: COMMERCIAL

## 2018-08-21 ENCOUNTER — HOSPITAL ENCOUNTER (OUTPATIENT)
Dept: PHYSICAL THERAPY | Age: 73
Discharge: HOME | End: 2018-08-21
Payer: COMMERCIAL

## 2018-08-21 PROCEDURE — 97112 NEUROMUSCULAR REEDUCATION: CPT

## 2018-08-21 PROCEDURE — 97140 MANUAL THERAPY 1/> REGIONS: CPT

## 2018-08-21 PROCEDURE — 97110 THERAPEUTIC EXERCISES: CPT

## 2018-08-21 NOTE — PROGRESS NOTES
PT DAILY TREATMENT NOTE     Patient Name: Dylan Hopkins  Date:2018  : 1945  [x]  Patient  Verified  Payor: BLUE CROSS / Plan: [employer] [facility] / Product Type: PPO /    In time:200  Out time:251  Total Treatment Time (min): 51  Visit #: 3 of 10    Treatment Area: Adhesive capsulitis of right shoulder [M75.01]    SUBJECTIVE  Pain Level (0-10 scale): 2  Any medication changes, allergies to medications, adverse drug reactions, diagnosis change, or new procedure performed?: [x] No    [] Yes (see summary sheet for update)  Subjective functional status/changes:   [x] No changes reported    OBJECTIVE    18 min Therapeutic Exercise:  [x] See flow sheet :   Rationale: increase ROM and increase strength to improve the patients ability to perform ADL's.    25 min Neuromuscular Re-education:  [x]  See flow sheet :   Rationale: increase ROM, increase strength, improve coordination and increase proprioception  to improve the patients ability to perform functional tasks. 8 min Manual Therapy:  PROM in all planes to endrange, gentle distraction to shoulder joint. Rationale: decrease pain, increase ROM, decrease trigger points and increase postural awareness to improve quality of life. With   [x] TE   [] TA   [] neuro   [] other: Patient Education: [x] Review HEP    [] Progressed/Changed HEP based on:   [] positioning   [x] body mechanics   [] transfers   [] heat/ice application    [] other:      Other Objective/Functional Measures: right shoulder flexion AROM 0-145 deg    Pain Level (0-10 scale) post treatment: 2    ASSESSMENT/Changes in Function: Pt requires cuing for postural corrections with overhead activities. He demonstrates weakness in scapular muscles and tightness in pectoralis major and latissimus dorsi.       Patient will continue to benefit from skilled PT services to modify and progress therapeutic interventions, address functional mobility deficits, address ROM deficits, address strength deficits, analyze and address soft tissue restrictions, analyze and cue movement patterns, analyze and modify body mechanics/ergonomics and assess and modify postural abnormalities to attain remaining goals. [x]  See Plan of Care  []  See progress note/recertification  []  See Discharge Summary         Progress towards goals / Updated goals:  Short Term Goals: To be accomplished in 2 weeks:  1.  Pt to report [place] with HEP. Eval status: HEP issued and reviewed physically/verbally with pt      Long Term Goals: To be accomplished in 4 weeks: 1.    Pt to report score of 72 on FOTO, indicating improved tolerance to activity and reduced pain.                        Eval status: 63 on initial FOTO  2.  Pt to demonstrate full R shoulder AROM/PROM at 160 degrees flexion/abduction, 75 degrees ER (90/90), able to reach C7 with IR behind the back  Eval status: R shoulder AROM 110 degrees flexion, 116 degrees abduction, 68 degrees ER (90/90) with substitutions, able to reach to sacrum with behind the back IR  Currently: right shoulder flexion AROM 0-145 deg  3.  Pt to be able to perform overhead and upper body hygiene/dressing tasks without increased c/o pain or difficulty.   Eval status: limited by pain and reduced ROM    PLAN  []  Upgrade activities as tolerated     [x]  Continue plan of care  []  Update interventions per flow sheet       []  Discharge due to:_  []  Other:_      Akankshaon Lydia 8/21/2018  2:10 PM    Future Appointments  Date Time Provider Verena Chapman   8/23/2018 2:30 PM Octavia Solo PTA [facility]   8/28/2018 2:30 PM [place] [facility]   8/30/2018 2:00 PM Octavia Solo PTA [facility]

## 2018-08-23 ENCOUNTER — HOSPITAL ENCOUNTER (OUTPATIENT)
Dept: PHYSICAL THERAPY | Age: 73
Discharge: HOME | End: 2018-08-23
Payer: COMMERCIAL

## 2018-08-23 PROCEDURE — 97112 NEUROMUSCULAR REEDUCATION: CPT

## 2018-08-23 PROCEDURE — 97110 THERAPEUTIC EXERCISES: CPT

## 2018-08-23 PROCEDURE — 97140 MANUAL THERAPY 1/> REGIONS: CPT

## 2018-08-23 NOTE — PROGRESS NOTES
PT DAILY TREATMENT NOTE - [facility]     Patient Name: Kamini Ivy  Date:2018  : 1945  [x]  Patient  Verified  Payor: BLUE CROSS / Plan:  [facility] [place] / Product Type: PPO /    In time:230  Out time:308  Total Treatment Time (min): 38  Visit #: 4 of 10    Treatment Area: Adhesive capsulitis of right shoulder [M75.01]    SUBJECTIVE  Pain Level (0-10 scale): 2  Any medication changes, allergies to medications, adverse drug reactions, diagnosis change, or new procedure performed?: [x] No    [] Yes (see summary sheet for update)  Subjective functional status/changes:   [] No changes reported  \"I feel better than I did. \"    OBJECTIVE    Modality rationale: patient declined   Min Type Additional Details    [] Estim:  []Unatt       []IFC  []Premod                        []Other:  []w/ice   []w/heat  Position:  Location:    [] Estim: []Att    []TENS instruct  []NMES                    []Other:  []w/US   []w/ice   []w/heat  Position:  Location:    []  Traction: [] Cervical       []Lumbar                       [] Prone          []Supine                       []Intermittent   []Continuous Lbs:  [] before manual  [] after manual    []  Ultrasound: []Continuous   [] Pulsed                           []1MHz   []3MHz W/cm2:  Location:    []  Iontophoresis with dexamethasone         Location: [] Take home patch   [] In clinic    []  Ice     []  heat  []  Ice massage  []  Laser   []  Anodyne Position:  Location:    []  Laser with stim  []  Other:  Position:  Location:    []  Vasopneumatic Device Pressure:       [] lo [] med [] hi   Temperature: [] lo [] med [] hi   [] Skin assessment post-treatment:  []intact []redness- no adverse reaction    []redness - adverse reaction:     15 min Therapeutic Exercise:  [x] See flow sheet :   Rationale: increase ROM and increase strength to improve the patients ability to perform ADLs    15 min Neuromuscular Re-education:  [x]  See flow sheet : scap re-ed   Rationale: increase ROM, increase strength, improve coordination, improve balance and increase proprioception  to improve the patients ability to improve mobility and reaching    8 min Manual Therapy:  Sidelying scap mobs to right scapula, PROM with gentle mobs to for shoulder elevation on the right shoulder   Rationale: decrease pain, increase ROM, increase tissue extensibility, decrease trigger points and increase postural awareness to improve mobility and reaching        With   [x] TE   [] TA   [x] neuro   [] other: Patient Education: [x] Review HEP    [] Progressed/Changed HEP based on:   [x] positioning   [x] body mechanics   [] transfers   [] heat/ice application    [] other:      Other Objective/Functional Measures:      Pain Level (0-10 scale) post treatment: 2    ASSESSMENT/Changes in Function: Pt had improved passive motion today. Decreased pain at end ranges. He reports improvements with pain, but still has difficulty with IR. Patient will continue to benefit from skilled PT services to modify and progress therapeutic interventions, address functional mobility deficits, address ROM deficits, address strength deficits, analyze and address soft tissue restrictions, analyze and cue movement patterns, analyze and modify body mechanics/ergonomics, assess and modify postural abnormalities, address imbalance/dizziness and instruct in home and community integration to attain remaining goals. [x]  See Plan of Care  []  See progress note/recertification  []  See Discharge Summary         Progress towards goals / Updated goals:  Short Term Goals: To be accomplished in 2 weeks:  1. Pt to report [place] with HEP. Eval status: HEP issued and reviewed physically/verbally with pt    MET  Long Term Goals: To be accomplished in 4 weeks:  1. Pt to report score of 72 on FOTO, indicating improved tolerance to activity and reduced pain. Eval status: 63 on initial FOTO   Assess at NV  2.   Pt to demonstrate full R shoulder AROM/PROM at 160 degrees flexion/abduction, 75 degrees ER (90/90), able to reach C7 with IR behind the back   Eval status: R shoulder AROM 110 degrees flexion, 116 degrees abduction, 68 degrees ER (90/90) with substitutions, able to reach to sacrum with behind the back IR   Improving with passive motion  3. Pt to be able to perform overhead and upper body hygiene/dressing tasks without increased c/o pain or difficulty.    Eval status: limited by pain and reduced ROM   Improving     PLAN  []  Upgrade activities as tolerated     [x]  Continue plan of care  []  Update interventions per flow sheet       []  Discharge due to:_  []  Other:_      Alberto Howard PTA, CSCS 8/23/2018  3:39 PM    Future Appointments  Date Time Provider Verena Chapman   8/23/2018 2:30 PM Alberto Howard PTA [facility]   8/28/2018 2:30 PM [facility] [place] [place] [facility]   8/30/2018 2:00 PM Alberto Howard PTA [facility]

## 2018-08-28 ENCOUNTER — HOSPITAL ENCOUNTER (OUTPATIENT)
Dept: PHYSICAL THERAPY | Age: 73
Discharge: HOME | End: 2018-08-28
Payer: COMMERCIAL

## 2018-08-28 PROCEDURE — 97140 MANUAL THERAPY 1/> REGIONS: CPT | Performed by: PHYSICAL THERAPIST

## 2018-08-28 PROCEDURE — 97110 THERAPEUTIC EXERCISES: CPT | Performed by: PHYSICAL THERAPIST

## 2018-08-28 NOTE — PROGRESS NOTES
PT DAILY TREATMENT NOTE     Patient Name: Negrita Mejía  Date:2018  : 1945  [x]  Patient  Verified  Payor: BLUE CROSS / Plan: [employer] [facility] [place] / Product Type: PPO /    In time:225  Out time:303  Total Treatment Time (min): 38  Visit #: 5 of 10    Treatment Area: Adhesive capsulitis of right shoulder [M75.01]    SUBJECTIVE  Pain Level (0-10 scale): 1  Any medication changes, allergies to medications, adverse drug reactions, diagnosis change, or new procedure performed?: [x] No    [] Yes (see summary sheet for update)  Subjective functional status/changes:   [x] No changes reported    OBJECTIVE     30 min Therapeutic Exercise:  [x] See flow sheet :   Rationale: increase ROM, increase strength, improve coordination and increase proprioception to improve the patients ability to tolerate daily activities with improved mobility/stability and reduced R shoulder pain.     8 min Manual Therapy:  PROM in all planes to endrange, gentle distraction to shoulder joint. Rationale: decrease pain, increase ROM and increase tissue extensibility to tolerate daily activities with improved mobility/stability and reduced R shoulder pain.      With   [x] TE   [] TA   [x] neuro   [] other: Patient Education: [x] Review HEP    [] Progressed/Changed HEP based on:   [] positioning   [] body mechanics   [] transfers   [] heat/ice application    [] other:       Other Objective/Functional Measures: supine AROM shoulder flexion 130     Pain Level (0-10 scale) post treatment: 0     ASSESSMENT/Changes in Function: Pt demonstrates improving motion; still requires occasional cues for body mechanics during standing AROM tasks.   Supine AROM shoulder flexion improved to 130 today.     Patient will continue to benefit from skilled PT services to modify and progress therapeutic interventions, address functional mobility deficits, address ROM deficits, address strength deficits, analyze and address soft tissue restrictions, analyze and cue movement patterns, analyze and modify body mechanics/ergonomics and assess and modify postural abnormalities to attain remaining goals.     Progress towards goals / Updated goals:  Short Term Goals: To be accomplished in 2 weeks:  1.  Pt to report [place] with HEP. Eval status: HEP issued and reviewed physically/verbally with pt                        MET  Long Term Goals: To be accomplished in 4 weeks: 1.    Pt to report score of 72 on FOTO, indicating improved tolerance to activity and reduced pain. Eval status: 63 on initial FOTO                        Assess at NV  2.  Pt to demonstrate full R shoulder AROM/PROM at 160 degrees flexion/abduction, 75 degrees ER (90/90), able to reach C7 with IR behind the back                        Eval status: R shoulder AROM 110 degrees flexion, 116 degrees abduction, 68 degrees ER (90/90) with substitutions, able to reach to sacrum with behind the back IR                        Progressing: R shoulder AROM 130 degrees flexion  3.  Pt to be able to perform overhead and upper body hygiene/dressing tasks without increased c/o pain or difficulty.                         Eval status: limited by pain and reduced ROM                        Improving         PLAN  []  Upgrade activities as tolerated     [x]  Continue plan of care  []  Update interventions per flow sheet       []  Discharge due to:_  []  Other:_      Froilan Torres PT 8/28/2018  3:45 PM    Future Appointments  Date Time Provider Verena Chapman   8/30/2018 2:00 PM Akil Odonnell PTA [facility]   9/5/2018 2:00 PM Ailyn [place] [facility]   9/7/2018 2:00 [facility]   9/11/2018 3:30 PM [facility]   9/13/2018 3:00 PM Akil Odonnell PTA [facility]

## 2018-08-30 ENCOUNTER — HOSPITAL ENCOUNTER (OUTPATIENT)
Dept: PHYSICAL THERAPY | Age: 73
Discharge: HOME | End: 2018-08-30
Payer: COMMERCIAL

## 2018-08-30 PROCEDURE — 97110 THERAPEUTIC EXERCISES: CPT

## 2018-08-30 PROCEDURE — 97112 NEUROMUSCULAR REEDUCATION: CPT

## 2018-08-30 NOTE — PROGRESS NOTES
PT DAILY TREATMENT NOTE     Patient Name: Jesi Hand  Date:2018  : 1945  [x]  Patient  Verified  Payor: BLUE CROSS / Plan: [facility] / Product Type: PPO /    In time:200  Out time:235  Total Treatment Time (min): 35  Visit #: 6 of 10    Treatment Area: Adhesive capsulitis of right shoulder [M75.01]    SUBJECTIVE  Pain Level (0-10 scale): 0  Any medication changes, allergies to medications, adverse drug reactions, diagnosis change, or new procedure performed?: [x] No    [] Yes (see summary sheet for update)  Subjective functional status/changes:   [] No changes reported  \"No pain. \"    OBJECTIVE    Modality rationale: patient declined   Min Type Additional Details    [] Estim:  []Unatt       []IFC  []Premod                        []Other:  []w/ice   []w/heat  Position:  Location:    [] Estim: []Att    []TENS instruct  []NMES                    []Other:  []w/US   []w/ice   []w/heat  Position:  Location:    []  Traction: [] Cervical       []Lumbar                       [] Prone          []Supine                       []Intermittent   []Continuous Lbs:  [] before manual  [] after manual    []  Ultrasound: []Continuous   [] Pulsed                           []1MHz   []3MHz W/cm2:  Location:    []  Iontophoresis with dexamethasone         Location: [] Take home patch   [] In clinic    []  Ice     []  heat  []  Ice massage  []  Laser   []  Anodyne Position:  Location:    []  Laser with stim  []  Other:  Position:  Location:    []  Vasopneumatic Device Pressure:       [] lo [] med [] hi   Temperature: [] lo [] med [] hi   [] Skin assessment post-treatment:  []intact []redness- no adverse reaction    []redness - adverse reaction:     10 min Therapeutic Exercise:  [x] See flow sheet :   Rationale: increase ROM and increase strength to improve the patients ability to perform ADLs    17 min Neuromuscular Re-education:  [x]  See flow sheet : scap re-ed activities   Rationale: increase ROM, increase strength, improve coordination, improve balance and increase proprioception  to improve the patients ability to improve mobility and reaching    8 min Manual Therapy:  Sidelying scap mobs to right scapula, PROM with gentle mobs to for shoulder elevation on the right shoulder   Rationale: decrease pain, increase ROM, increase tissue extensibility, decrease trigger points and increase postural awareness to improve mobility and reaching        With   [x] TE   [] TA   [x] neuro   [] other: Patient Education: [x] Review HEP    [] Progressed/Changed HEP based on:   [x] positioning   [x] body mechanics   [] transfers   [] heat/ice application    [] other:      Other Objective/Functional Measures:      Pain Level (0-10 scale) post treatment: 1    ASSESSMENT/Changes in Function: Pt continues to improve with functional mobility, strength, ROM, and pain relief. He was near full passive motion with flexion today. Pt to be reassessed at his NV. Patient will continue to benefit from skilled PT services to modify and progress therapeutic interventions, address functional mobility deficits, address ROM deficits, address strength deficits, analyze and address soft tissue restrictions, analyze and cue movement patterns, analyze and modify body mechanics/ergonomics, assess and modify postural abnormalities, address imbalance/dizziness and instruct in home and community integration to attain remaining goals. [x]  See Plan of Care  []  See progress note/recertification  []  See Discharge Summary         Progress towards goals / Updated goals:  Short Term Goals: To be accomplished in 2 weeks:  1.  Pt to report [place] with HEP. Eval status: HEP issued and reviewed physically/verbally with pt   MET  Long Term Goals: To be accomplished in 4 weeks: 1.    Pt to report score of 72 on FOTO, indicating improved tolerance to activity and reduced pain.    Eval status: 63 on initial FOTO   Assess at NV  2.  Pt to demonstrate full R shoulder AROM/PROM at 160 degrees flexion/abduction, 75 degrees ER (90/90), able to reach C7 with IR behind the back   Eval status: R shoulder AROM 110 degrees flexion, 116 degrees abduction, 68 degrees ER (90/90) with substitutions, able to reach to sacrum with behind the back IR   Progressing: R shoulder AROM 130 degrees flexion  3.  Pt to be able to perform overhead and upper body hygiene/dressing tasks without increased c/o pain or difficulty.    Eval status: limited by pain and reduced ROM   Improving     PLAN  []  Upgrade activities as tolerated     [x]  Continue plan of care  []  Update interventions per flow sheet       []  Discharge due to:_  []  Other:_      Alberto Howard PTA, CSCS 8/30/2018  2:36 PM    Future Appointments  Date Time Provider Verena Chapman   9/5/2018 2:00 PM [place] [facility]   9/7/2018 2:00 PM [place] [facility]   9/11/2018 3:30 PM Ariadne Dorman PT [facility]   9/13/2018 3:00 PM Alberto Howard PTA [facility]

## 2018-09-05 ENCOUNTER — HOSPITAL ENCOUNTER (OUTPATIENT)
Dept: PHYSICAL THERAPY | Age: 73
Discharge: HOME | End: 2018-09-05
Payer: COMMERCIAL

## 2018-09-05 PROCEDURE — 97110 THERAPEUTIC EXERCISES: CPT | Performed by: PHYSICAL THERAPIST

## 2018-09-05 PROCEDURE — 97140 MANUAL THERAPY 1/> REGIONS: CPT | Performed by: PHYSICAL THERAPIST

## 2018-09-05 NOTE — PROGRESS NOTES
PT DISCHARGE DAILY NOTE AND [id number]    Date:2018  Patient name: Betzy Lemus Start of Care: 2018      Referral source: Ashlee Cordoba MD : 1945                         Medical Diagnosis: Pain in right shoulder [M25.511] Onset Date:                         Treatment Diagnosis: right shoulder pain   Prior Hospitalization: see medical history Provider#: [id number]   Medications: Verified on Patient summary List    Comorbidities: neuropathy in B hands/feet, OA, HTN   Prior Level of Function: retired, functionally independent    Visits from [facility] Care: 7    Missed Visits: 0    Reporting Period : 18 to 18    [x]  Patient  Verified  Payor: BLUE CROSS / Plan: [facility] / Product Type: PPO /    In time:  Out time:249  Total Treatment Time (min): 52  Visit #: 7 of 10    SUBJECTIVE  Pain Level (0-10 scale): 1  Any medication changes, allergies to medications, adverse drug reactions, diagnosis change, or new procedure performed?: [x] No    [] Yes (see summary sheet for update)  Subjective functional status/changes:   [] No changes reported  \"I'm ready to graduate today. \"    OBJECTIVE    39 min Therapeutic Exercise:  [x] See flow sheet :   Rationale: increase ROM, increase strength, improve coordination and increase proprioception to improve the patients ability to tolerate daily activities with improved mobility/stability and reduced R shoulder pain.      8 min Manual Therapy:  PROM in all planes to endrange, gentle distraction to shoulder joint.    Rationale: decrease pain, increase ROM and increase tissue extensibility to tolerate daily activities with improved mobility/stability and reduced R shoulder pain.          With   [] TE   [] TA   [] neuro   [] other: Patient Education: [x] Review HEP    [] Progressed/Changed HEP based on:   [] positioning   [] body mechanics   [] transfers   [] heat/ice application    [] other:      Other Objective/Functional Measures: See updated goals. Pain Level (0-10 scale) post treatment: 0    Summary of Care:  Short Term Goals: To be accomplished in 2 weeks:  1.  Pt to report [place] with HEP. Eval status: HEP issued and reviewed physically/verbally with pt                        MET  Long Term Goals: To be accomplished in 4 weeks: 1.    Pt to report score of 72 on FOTO, indicating improved tolerance to activity and reduced pain. Eval status: 63 on initial FOTO                        MET: 95 on final FOTO   2.  Pt to demonstrate full R shoulder AROM/PROM at 160 degrees flexion/abduction, 75 degrees ER (90/90), able to reach C7 with IR behind the back                        Eval status: R shoulder AROM 110 degrees flexion, 116 degrees abduction, 68 degrees ER (90/90) with substitutions, able to reach to sacrum with behind the back IR                       NOT MET, but progressed significantly: R shoulder AROM 139 degrees flexion, 143 degrees abduction, 67 degrees ER (90/90) with no substitutions, able to reach to L1 with behind the back IR  3.  Pt to be able to perform overhead and upper body hygiene/dressing tasks without increased c/o pain or difficulty. Eval status: limited by pain and reduced ROM                        MET: pt reports no difficulty      Functional Gains: \"My pain and movement are better. \"  Functional Deficits: \"Internal rotation still hurts the most.\"  % improvement: 95%  Pain   Average: 0/10       Best: 0/10     Worst: 2/10  Patient Goal: \"Get rid of the pain completely. \"    ASSESSMENT/Changes in Function: Mr. Javier Leung has been a pleasure to treat during his bout of therapy. He notes significant improvements in his functional mobility and in his shoulder AROM/PROM. He has met the majority of his goals, and is confident to progress to an Independent HEP at this visit.   We will consider him discharged from therapy at this time.    Thank you for this referral!      PLAN  [x]Discontinue therapy: [x]Patient has reached or is progressing toward set goals      []Patient is non-compliant or has abdicated      []Due to lack of appreciable progress towards set goals    Rubi Martini, JOEL 9/5/2018  2:24 PM

## 2018-09-07 ENCOUNTER — APPOINTMENT (OUTPATIENT)
Dept: PHYSICAL THERAPY | Age: 73
End: 2018-09-07
Payer: COMMERCIAL

## 2020-09-23 ENCOUNTER — HOSPITAL ENCOUNTER (OUTPATIENT)
Dept: GENERAL RADIOLOGY | Age: 75
Discharge: HOME | End: 2020-09-23
Payer: COMMERCIAL

## 2020-09-23 ENCOUNTER — HOSPITAL ENCOUNTER (OUTPATIENT)
Dept: LAB | Age: 75
Discharge: HOME | End: 2020-09-23
Payer: COMMERCIAL

## 2020-09-23 DIAGNOSIS — Z01.811 PRE-OP CHEST EXAM: ICD-10-CM

## 2020-09-23 DIAGNOSIS — Z01.818 PRE-OP TESTING: ICD-10-CM

## 2020-09-23 LAB
ANION GAP SERPL CALC-SCNC: 6 MMOL/L (ref 3–18)
BASOPHILS # BLD: 0 K/UL (ref 0–0.1)
BASOPHILS NFR BLD: 0 % (ref 0–2)
BUN SERPL-MCNC: 13 MG/DL (ref 7–18)
BUN/CREAT SERPL: 14 (ref 12–20)
CALCIUM SERPL-MCNC: 8.8 MG/DL (ref 8.5–10.1)
CHLORIDE SERPL-SCNC: 108 MMOL/L (ref 100–111)
CO2 SERPL-SCNC: 28 MMOL/L (ref 21–32)
CREAT SERPL-MCNC: 0.9 MG/DL (ref 0.6–1.3)
DIFFERENTIAL METHOD BLD: NORMAL
EOSINOPHIL # BLD: 0.1 K/UL (ref 0–0.4)
EOSINOPHIL NFR BLD: 1 % (ref 0–5)
ERYTHROCYTE [DISTWIDTH] IN BLOOD BY AUTOMATED COUNT: 12.5 % (ref 11.6–14.5)
GLUCOSE SERPL-MCNC: 95 MG/DL (ref 74–99)
HCT VFR BLD AUTO: 45.4 % (ref 36–48)
HGB BLD-MCNC: 15.4 G/DL (ref 13–16)
LYMPHOCYTES # BLD: 1.8 K/UL (ref 0.9–3.6)
LYMPHOCYTES NFR BLD: 25 % (ref 21–52)
MCH RBC QN AUTO: 32.4 PG (ref 24–34)
MCHC RBC AUTO-ENTMCNC: 33.9 G/DL (ref 31–37)
MCV RBC AUTO: 95.4 FL (ref 74–97)
MONOCYTES # BLD: 0.6 K/UL (ref 0.05–1.2)
MONOCYTES NFR BLD: 8 % (ref 3–10)
NEUTS SEG # BLD: 4.7 K/UL (ref 1.8–8)
NEUTS SEG NFR BLD: 66 % (ref 40–73)
PLATELET # BLD AUTO: 231 K/UL (ref 135–420)
PMV BLD AUTO: 9.5 FL (ref 9.2–11.8)
POTASSIUM SERPL-SCNC: 4.2 MMOL/L (ref 3.5–5.5)
RBC # BLD AUTO: 4.76 M/UL (ref 4.7–5.5)
SODIUM SERPL-SCNC: 142 MMOL/L (ref 136–145)
WBC # BLD AUTO: 7.1 K/UL (ref 4.6–13.2)

## 2020-09-23 PROCEDURE — 85025 COMPLETE CBC W/AUTO DIFF WBC: CPT

## 2020-09-23 PROCEDURE — 93005 ELECTROCARDIOGRAM TRACING: CPT

## 2020-09-23 PROCEDURE — 80048 BASIC METABOLIC PNL TOTAL CA: CPT

## 2020-09-23 PROCEDURE — 36415 COLL VENOUS BLD VENIPUNCTURE: CPT

## 2020-09-23 PROCEDURE — 71046 X-RAY EXAM CHEST 2 VIEWS: CPT

## 2020-09-24 LAB
ATRIAL RATE: 50 BPM
CALCULATED P AXIS, ECG09: 46 DEGREES
CALCULATED R AXIS, ECG10: -35 DEGREES
CALCULATED T AXIS, ECG11: 44 DEGREES
DIAGNOSIS, 93000: NORMAL
P-R INTERVAL, ECG05: 154 MS
Q-T INTERVAL, ECG07: 466 MS
QRS DURATION, ECG06: 90 MS
QTC CALCULATION (BEZET), ECG08: 424 MS
VENTRICULAR RATE, ECG03: 50 BPM

## 2024-01-31 ENCOUNTER — HOSPITAL ENCOUNTER (INPATIENT)
Facility: HOSPITAL | Age: 79
LOS: 2 days | Discharge: HOME | DRG: 322 | End: 2024-02-02
Attending: EMERGENCY MEDICINE | Admitting: HOSPITALIST
Payer: MEDICARE

## 2024-01-31 ENCOUNTER — APPOINTMENT (OUTPATIENT)
Facility: HOSPITAL | Age: 79
DRG: 322 | End: 2024-01-31
Payer: MEDICARE

## 2024-01-31 DIAGNOSIS — N41.1 CHRONIC PROSTATITIS: Primary | ICD-10-CM

## 2024-01-31 DIAGNOSIS — I21.4 NSTEMI (NON-ST ELEVATED MYOCARDIAL INFARCTION) (HCC): ICD-10-CM

## 2024-01-31 PROBLEM — F32.A DEPRESSION: Status: ACTIVE | Noted: 2024-01-31

## 2024-01-31 PROBLEM — N17.9 AKI (ACUTE KIDNEY INJURY) (HCC): Status: ACTIVE | Noted: 2024-01-31

## 2024-01-31 PROBLEM — E78.5 HYPERLIPIDEMIA: Status: ACTIVE | Noted: 2024-01-31

## 2024-01-31 PROBLEM — N40.0 BPH (BENIGN PROSTATIC HYPERPLASIA): Status: ACTIVE | Noted: 2024-01-31

## 2024-01-31 PROBLEM — G62.9 NEUROPATHY: Status: ACTIVE | Noted: 2024-01-31

## 2024-01-31 PROBLEM — I10 HYPERTENSION: Status: ACTIVE | Noted: 2024-01-31

## 2024-01-31 PROBLEM — F41.9 ANXIETY: Status: ACTIVE | Noted: 2024-01-31

## 2024-01-31 LAB
ANION GAP SERPL CALC-SCNC: 8 MMOL/L (ref 3–18)
BASOPHILS # BLD: 0 K/UL (ref 0–0.1)
BASOPHILS NFR BLD: 0 % (ref 0–2)
BUN SERPL-MCNC: 19 MG/DL (ref 7–18)
BUN/CREAT SERPL: 10 (ref 12–20)
CALCIUM SERPL-MCNC: 8.6 MG/DL (ref 8.5–10.1)
CHLORIDE SERPL-SCNC: 109 MMOL/L (ref 100–111)
CO2 SERPL-SCNC: 20 MMOL/L (ref 21–32)
CREAT SERPL-MCNC: 1.99 MG/DL (ref 0.6–1.3)
DIFFERENTIAL METHOD BLD: ABNORMAL
EOSINOPHIL # BLD: 0.2 K/UL (ref 0–0.4)
EOSINOPHIL NFR BLD: 2 % (ref 0–5)
ERYTHROCYTE [DISTWIDTH] IN BLOOD BY AUTOMATED COUNT: 11.9 % (ref 11.6–14.5)
GLUCOSE SERPL-MCNC: 127 MG/DL (ref 74–99)
HCT VFR BLD AUTO: 38.6 % (ref 36–48)
HGB BLD-MCNC: 12.8 G/DL (ref 13–16)
IMM GRANULOCYTES # BLD AUTO: 0 K/UL (ref 0–0.04)
IMM GRANULOCYTES NFR BLD AUTO: 0 % (ref 0–0.5)
LYMPHOCYTES # BLD: 0.9 K/UL (ref 0.9–3.6)
LYMPHOCYTES NFR BLD: 7 % (ref 21–52)
MAGNESIUM SERPL-MCNC: 2.3 MG/DL (ref 1.6–2.6)
MCH RBC QN AUTO: 31.8 PG (ref 24–34)
MCHC RBC AUTO-ENTMCNC: 33.2 G/DL (ref 31–37)
MCV RBC AUTO: 95.8 FL (ref 78–100)
MONOCYTES # BLD: 0.9 K/UL (ref 0.05–1.2)
MONOCYTES NFR BLD: 8 % (ref 3–10)
NEUTS SEG # BLD: 9.4 K/UL (ref 1.8–8)
NEUTS SEG NFR BLD: 83 % (ref 40–73)
NRBC # BLD: 0 K/UL (ref 0–0.01)
NRBC BLD-RTO: 0 PER 100 WBC
NT PRO BNP: 396 PG/ML (ref 0–1800)
PLATELET # BLD AUTO: 255 K/UL (ref 135–420)
PMV BLD AUTO: 9.4 FL (ref 9.2–11.8)
POTASSIUM SERPL-SCNC: 4.1 MMOL/L (ref 3.5–5.5)
RBC # BLD AUTO: 4.03 M/UL (ref 4.35–5.65)
SODIUM SERPL-SCNC: 137 MMOL/L (ref 136–145)
TROPONIN I SERPL HS-MCNC: 1993 NG/L (ref 0–78)
TROPONIN I SERPL HS-MCNC: 321 NG/L (ref 0–78)
WBC # BLD AUTO: 11.5 K/UL (ref 4.6–13.2)

## 2024-01-31 PROCEDURE — 80048 BASIC METABOLIC PNL TOTAL CA: CPT

## 2024-01-31 PROCEDURE — 6360000004 HC RX CONTRAST MEDICATION

## 2024-01-31 PROCEDURE — 99285 EMERGENCY DEPT VISIT HI MDM: CPT

## 2024-01-31 PROCEDURE — 93005 ELECTROCARDIOGRAM TRACING: CPT | Performed by: EMERGENCY MEDICINE

## 2024-01-31 PROCEDURE — 83735 ASSAY OF MAGNESIUM: CPT

## 2024-01-31 PROCEDURE — 71275 CT ANGIOGRAPHY CHEST: CPT

## 2024-01-31 PROCEDURE — 6370000000 HC RX 637 (ALT 250 FOR IP)

## 2024-01-31 PROCEDURE — 85025 COMPLETE CBC W/AUTO DIFF WBC: CPT

## 2024-01-31 PROCEDURE — 1100000000 HC RM PRIVATE

## 2024-01-31 PROCEDURE — 6370000000 HC RX 637 (ALT 250 FOR IP): Performed by: HOSPITALIST

## 2024-01-31 PROCEDURE — 96374 THER/PROPH/DIAG INJ IV PUSH: CPT

## 2024-01-31 PROCEDURE — 99223 1ST HOSP IP/OBS HIGH 75: CPT

## 2024-01-31 PROCEDURE — 6360000002 HC RX W HCPCS

## 2024-01-31 PROCEDURE — 84484 ASSAY OF TROPONIN QUANT: CPT

## 2024-01-31 PROCEDURE — 2580000003 HC RX 258

## 2024-01-31 PROCEDURE — 83880 ASSAY OF NATRIURETIC PEPTIDE: CPT

## 2024-01-31 RX ORDER — ACETAMINOPHEN 325 MG/1
650 TABLET ORAL EVERY 6 HOURS PRN
Status: DISCONTINUED | OUTPATIENT
Start: 2024-01-31 | End: 2024-02-02 | Stop reason: HOSPADM

## 2024-01-31 RX ORDER — SODIUM CHLORIDE 0.9 % (FLUSH) 0.9 %
5-40 SYRINGE (ML) INJECTION EVERY 12 HOURS SCHEDULED
Status: DISCONTINUED | OUTPATIENT
Start: 2024-01-31 | End: 2024-02-02 | Stop reason: HOSPADM

## 2024-01-31 RX ORDER — HEPARIN SODIUM 1000 [USP'U]/ML
2000 INJECTION, SOLUTION INTRAVENOUS; SUBCUTANEOUS PRN
Status: DISCONTINUED | OUTPATIENT
Start: 2024-01-31 | End: 2024-02-02 | Stop reason: HOSPADM

## 2024-01-31 RX ORDER — ACETAMINOPHEN 650 MG/1
650 SUPPOSITORY RECTAL EVERY 6 HOURS PRN
Status: DISCONTINUED | OUTPATIENT
Start: 2024-01-31 | End: 2024-02-02 | Stop reason: HOSPADM

## 2024-01-31 RX ORDER — HEPARIN SODIUM 1000 [USP'U]/ML
4000 INJECTION, SOLUTION INTRAVENOUS; SUBCUTANEOUS ONCE
Status: COMPLETED | OUTPATIENT
Start: 2024-01-31 | End: 2024-01-31

## 2024-01-31 RX ORDER — SODIUM CHLORIDE 9 MG/ML
INJECTION, SOLUTION INTRAVENOUS CONTINUOUS
Status: DISPENSED | OUTPATIENT
Start: 2024-01-31 | End: 2024-02-01

## 2024-01-31 RX ORDER — ATORVASTATIN CALCIUM 10 MG/1
10 TABLET, FILM COATED ORAL DAILY
Status: DISCONTINUED | OUTPATIENT
Start: 2024-01-31 | End: 2024-01-31

## 2024-01-31 RX ORDER — PAROXETINE HYDROCHLORIDE 20 MG/1
40 TABLET, FILM COATED ORAL DAILY
Status: DISCONTINUED | OUTPATIENT
Start: 2024-01-31 | End: 2024-02-02 | Stop reason: HOSPADM

## 2024-01-31 RX ORDER — POTASSIUM CHLORIDE 20 MEQ/1
40 TABLET, EXTENDED RELEASE ORAL PRN
Status: DISCONTINUED | OUTPATIENT
Start: 2024-01-31 | End: 2024-02-02 | Stop reason: HOSPADM

## 2024-01-31 RX ORDER — ARIPIPRAZOLE 5 MG/1
5 TABLET ORAL DAILY
Status: DISCONTINUED | OUTPATIENT
Start: 2024-01-31 | End: 2024-02-02 | Stop reason: HOSPADM

## 2024-01-31 RX ORDER — ONDANSETRON 2 MG/ML
4 INJECTION INTRAMUSCULAR; INTRAVENOUS EVERY 6 HOURS PRN
Status: DISCONTINUED | OUTPATIENT
Start: 2024-01-31 | End: 2024-02-02 | Stop reason: HOSPADM

## 2024-01-31 RX ORDER — FAMOTIDINE 20 MG/1
20 TABLET, FILM COATED ORAL DAILY
Status: DISCONTINUED | OUTPATIENT
Start: 2024-01-31 | End: 2024-02-02 | Stop reason: HOSPADM

## 2024-01-31 RX ORDER — AMLODIPINE BESYLATE 5 MG/1
5 TABLET ORAL DAILY
Status: DISCONTINUED | OUTPATIENT
Start: 2024-01-31 | End: 2024-02-02 | Stop reason: HOSPADM

## 2024-01-31 RX ORDER — ASPIRIN 81 MG/1
81 TABLET ORAL DAILY
Status: DISCONTINUED | OUTPATIENT
Start: 2024-02-01 | End: 2024-02-02 | Stop reason: HOSPADM

## 2024-01-31 RX ORDER — TAMSULOSIN HYDROCHLORIDE 0.4 MG/1
0.8 CAPSULE ORAL DAILY
Status: DISCONTINUED | OUTPATIENT
Start: 2024-01-31 | End: 2024-02-02 | Stop reason: HOSPADM

## 2024-01-31 RX ORDER — GABAPENTIN 300 MG/1
600 CAPSULE ORAL 2 TIMES DAILY
Status: DISCONTINUED | OUTPATIENT
Start: 2024-01-31 | End: 2024-02-02 | Stop reason: HOSPADM

## 2024-01-31 RX ORDER — IODIXANOL 320 MG/ML
80 INJECTION, SOLUTION INTRAVASCULAR
Status: COMPLETED | OUTPATIENT
Start: 2024-01-31 | End: 2024-01-31

## 2024-01-31 RX ORDER — NITROGLYCERIN 0.4 MG/1
0.4 TABLET SUBLINGUAL EVERY 5 MIN PRN
Status: DISCONTINUED | OUTPATIENT
Start: 2024-01-31 | End: 2024-02-02 | Stop reason: HOSPADM

## 2024-01-31 RX ORDER — HEPARIN SODIUM 1000 [USP'U]/ML
4000 INJECTION, SOLUTION INTRAVENOUS; SUBCUTANEOUS PRN
Status: DISCONTINUED | OUTPATIENT
Start: 2024-01-31 | End: 2024-02-02 | Stop reason: HOSPADM

## 2024-01-31 RX ORDER — HEPARIN SODIUM 10000 [USP'U]/100ML
5-30 INJECTION, SOLUTION INTRAVENOUS CONTINUOUS
Status: DISCONTINUED | OUTPATIENT
Start: 2024-01-31 | End: 2024-02-02 | Stop reason: HOSPADM

## 2024-01-31 RX ORDER — DILTIAZEM HYDROCHLORIDE 180 MG/1
180 CAPSULE, COATED, EXTENDED RELEASE ORAL DAILY
Status: DISCONTINUED | OUTPATIENT
Start: 2024-02-01 | End: 2024-02-01

## 2024-01-31 RX ORDER — ATORVASTATIN CALCIUM 40 MG/1
40 TABLET, FILM COATED ORAL DAILY
Status: DISCONTINUED | OUTPATIENT
Start: 2024-01-31 | End: 2024-02-02 | Stop reason: HOSPADM

## 2024-01-31 RX ORDER — ONDANSETRON 4 MG/1
4 TABLET, ORALLY DISINTEGRATING ORAL EVERY 8 HOURS PRN
Status: DISCONTINUED | OUTPATIENT
Start: 2024-01-31 | End: 2024-02-02 | Stop reason: HOSPADM

## 2024-01-31 RX ORDER — GABAPENTIN 600 MG/1
600 TABLET ORAL 2 TIMES DAILY
Status: DISCONTINUED | OUTPATIENT
Start: 2024-01-31 | End: 2024-01-31

## 2024-01-31 RX ORDER — POLYETHYLENE GLYCOL 3350 17 G/17G
17 POWDER, FOR SOLUTION ORAL DAILY PRN
Status: DISCONTINUED | OUTPATIENT
Start: 2024-01-31 | End: 2024-02-02 | Stop reason: HOSPADM

## 2024-01-31 RX ORDER — POTASSIUM CHLORIDE 7.45 MG/ML
10 INJECTION INTRAVENOUS PRN
Status: DISCONTINUED | OUTPATIENT
Start: 2024-01-31 | End: 2024-02-02 | Stop reason: HOSPADM

## 2024-01-31 RX ORDER — MAGNESIUM SULFATE IN WATER 40 MG/ML
2000 INJECTION, SOLUTION INTRAVENOUS PRN
Status: DISCONTINUED | OUTPATIENT
Start: 2024-01-31 | End: 2024-02-02 | Stop reason: HOSPADM

## 2024-01-31 RX ORDER — SODIUM CHLORIDE 0.9 % (FLUSH) 0.9 %
5-40 SYRINGE (ML) INJECTION PRN
Status: DISCONTINUED | OUTPATIENT
Start: 2024-01-31 | End: 2024-02-02 | Stop reason: HOSPADM

## 2024-01-31 RX ORDER — TAMSULOSIN HYDROCHLORIDE 0.4 MG/1
0.8 CAPSULE ORAL DAILY
COMMUNITY

## 2024-01-31 RX ADMIN — ATORVASTATIN CALCIUM 40 MG: 40 TABLET, FILM COATED ORAL at 21:34

## 2024-01-31 RX ADMIN — TAMSULOSIN HYDROCHLORIDE 0.8 MG: 0.4 CAPSULE ORAL at 21:34

## 2024-01-31 RX ADMIN — HEPARIN SODIUM 11 UNITS/KG/HR: 10000 INJECTION, SOLUTION INTRAVENOUS at 19:28

## 2024-01-31 RX ADMIN — GABAPENTIN 600 MG: 300 CAPSULE ORAL at 21:34

## 2024-01-31 RX ADMIN — FAMOTIDINE 20 MG: 20 TABLET ORAL at 21:34

## 2024-01-31 RX ADMIN — ARIPIPRAZOLE 5 MG: 5 TABLET ORAL at 21:34

## 2024-01-31 RX ADMIN — SODIUM CHLORIDE: 9 INJECTION, SOLUTION INTRAVENOUS at 21:38

## 2024-01-31 RX ADMIN — PAROXETINE HYDROCHLORIDE 40 MG: 20 TABLET, FILM COATED ORAL at 21:35

## 2024-01-31 RX ADMIN — IODIXANOL 80 ML: 320 INJECTION, SOLUTION INTRAVASCULAR at 18:51

## 2024-01-31 RX ADMIN — AMLODIPINE BESYLATE 5 MG: 5 TABLET ORAL at 21:34

## 2024-01-31 RX ADMIN — HEPARIN SODIUM 4000 UNITS: 1000 INJECTION INTRAVENOUS; SUBCUTANEOUS at 19:27

## 2024-01-31 ASSESSMENT — ENCOUNTER SYMPTOMS
NAUSEA: 1
RHINORRHEA: 0
SHORTNESS OF BREATH: 1
SORE THROAT: 0
VOMITING: 0
COUGH: 0
ABDOMINAL PAIN: 0
EYE DISCHARGE: 0

## 2024-01-31 ASSESSMENT — PAIN SCALES - GENERAL: PAINLEVEL_OUTOF10: 0

## 2024-01-31 NOTE — ED TRIAGE NOTES
Assumed care of pt in rm 8. Pt arrived from home for CP that started today. Pt took home Asprin and was given Nitro by EMS. Pt arrived pain free. Other hx noted. Pt is A Ox 4, lung sounds clear, abd very round soft, + 3-4 pitting edema to LLE. Pt reports this is new. 20ga PIV started to L AC, labs and EKG obtained

## 2024-01-31 NOTE — H&P
History and Physical          Subjective     HPI: Salvador Dumont is a 78 y.o. male with a PMHx of BPH, DMT2, HTN, neuropathy, anxiety, depression who presented to the ED with complaints of non radiating 10/10 chest pain which began today. States he took ASA at home and was given Nitro by the EMS, which alleviated his pain. Currently upon my evaluation, patient resting in bed in no distress. Patient is slow to respond but able to answer all questions appropriately. Currently denies chest pain. Denies associated SOB. VSS. Heparin gtt infusing without difficulty. Denies fever, chills, nvd, abdominal pain, headache, cough, dizziness. Denies smoking, drinking, illicit drug use.     In the ED, temp 98.9, Resp 19, HR 60's, /77, 93% on RA. Cr 1.99, K 4.1, Mag 2.3, proBNP negative. Trop 321. Hgb 12.8, otherwise CBC without much abnormality. EKG with NSR. CTA chest without evidence of PE. Cardiology consulted by the ED.   Received heparin bolus and initiated on heparin gtt in the ED.     Home meds reconciled with the patient  Code status discussed- FULL code     PMHx:  Past Medical History:   Diagnosis Date    BPH (benign prostatic hyperplasia)     Depression 01/31/2024    Diabetes (HCC)     Hypertension     Kidney disease     Kidney stone     Neuropathy     hands       PSurgHx:  Past Surgical History:   Procedure Laterality Date    ANTERIOR CRUCIATE LIGAMENT REPAIR  1997    CHOLECYSTECTOMY      GASTRIC BYPASS SURGERY      GASTRIC BYPASS SURGERY  2007    PROSTATECTOMY      UROLOGICAL SURGERY      IPP placement       SocialHx:  Social History     Socioeconomic History    Marital status:    Tobacco Use    Smoking status: Never    Smokeless tobacco: Never   Vaping Use    Vaping Use: Never used   Substance and Sexual Activity    Alcohol use: No    Drug use: No    Sexual activity: Not Currently       FamilyHx:  Family History   Problem Relation Age of Onset    No Known Problems Mother     Diabetes Sister     Diabetes  mmol/L    Glucose 127 (H) 74 - 99 mg/dL    BUN 19 (H) 7.0 - 18 MG/DL    Creatinine 1.99 (H) 0.6 - 1.3 MG/DL    Bun/Cre Ratio 10 (L) 12 - 20      Est, Glom Filt Rate 34 (L) >60 ml/min/1.73m2    Calcium 8.6 8.5 - 10.1 MG/DL   CBC with Auto Differential    Collection Time: 01/31/24  5:31 PM   Result Value Ref Range    WBC 11.5 4.6 - 13.2 K/uL    RBC 4.03 (L) 4.35 - 5.65 M/uL    Hemoglobin 12.8 (L) 13.0 - 16.0 g/dL    Hematocrit 38.6 36.0 - 48.0 %    MCV 95.8 78.0 - 100.0 FL    MCH 31.8 24.0 - 34.0 PG    MCHC 33.2 31.0 - 37.0 g/dL    RDW 11.9 11.6 - 14.5 %    Platelets 255 135 - 420 K/uL    MPV 9.4 9.2 - 11.8 FL    Nucleated RBCs 0.0 0  WBC    nRBC 0.00 0.00 - 0.01 K/uL    Neutrophils % 83 (H) 40 - 73 %    Lymphocytes % 7 (L) 21 - 52 %    Monocytes % 8 3 - 10 %    Eosinophils % 2 0 - 5 %    Basophils % 0 0 - 2 %    Immature Granulocytes 0 0.0 - 0.5 %    Neutrophils Absolute 9.4 (H) 1.8 - 8.0 K/UL    Lymphocytes Absolute 0.9 0.9 - 3.6 K/UL    Monocytes Absolute 0.9 0.05 - 1.2 K/UL    Eosinophils Absolute 0.2 0.0 - 0.4 K/UL    Basophils Absolute 0.0 0.0 - 0.1 K/UL    Absolute Immature Granulocyte 0.0 0.00 - 0.04 K/UL    Differential Type AUTOMATED     Magnesium    Collection Time: 01/31/24  5:31 PM   Result Value Ref Range    Magnesium 2.3 1.6 - 2.6 mg/dL   Troponin    Collection Time: 01/31/24  5:31 PM   Result Value Ref Range    Troponin, High Sensitivity 321 (HH) 0 - 78 ng/L   Brain Natriuretic Peptide    Collection Time: 01/31/24  5:31 PM   Result Value Ref Range    NT Pro- 0 - 1,800 PG/ML       Imaging Reviewed:  No results found.        Assessment/Plan     Hospital Problems             Last Modified POA    * (Principal) NSTEMI (non-ST elevated myocardial infarction) ([facility]) 1/31/2024 Yes    FLOR (acute kidney injury) ([facility]) 1/31/2024 Yes    BPH (benign prostatic hyperplasia) 1/31/2024 Yes    Hypertension 1/31/2024 Yes    Hyperlipidemia 1/31/2024 Yes    Neuropathy 1/31/2024 Yes    Anxiety 1/31/2024 Yes

## 2024-02-01 ENCOUNTER — APPOINTMENT (OUTPATIENT)
Facility: HOSPITAL | Age: 79
DRG: 322 | End: 2024-02-01
Payer: MEDICARE

## 2024-02-01 ENCOUNTER — HOSPITAL ENCOUNTER (INPATIENT)
Facility: HOSPITAL | Age: 79
DRG: 322 | End: 2024-02-01
Payer: MEDICARE

## 2024-02-01 LAB
ANION GAP SERPL CALC-SCNC: 8 MMOL/L (ref 3–18)
APTT PPP: 56.2 SEC (ref 23–36.4)
APTT PPP: 71.5 SEC (ref 23–36.4)
BASOPHILS # BLD: 0.1 K/UL (ref 0–0.1)
BASOPHILS NFR BLD: 1 % (ref 0–2)
BUN SERPL-MCNC: 15 MG/DL (ref 7–18)
BUN/CREAT SERPL: 10 (ref 12–20)
CALCIUM SERPL-MCNC: 9.1 MG/DL (ref 8.5–10.1)
CHLORIDE SERPL-SCNC: 114 MMOL/L (ref 100–111)
CHOLEST SERPL-MCNC: 117 MG/DL
CO2 SERPL-SCNC: 19 MMOL/L (ref 21–32)
CREAT SERPL-MCNC: 1.53 MG/DL (ref 0.6–1.3)
DIFFERENTIAL METHOD BLD: ABNORMAL
ECHO AO ASC DIAM: 3.8 CM
ECHO AO ASCENDING AORTA INDEX: 1.83 CM/M2
ECHO AO ROOT DIAM: 3.7 CM
ECHO AO ROOT INDEX: 1.78 CM/M2
ECHO AV AREA PEAK VELOCITY: 2.6 CM2
ECHO AV AREA/BSA PEAK VELOCITY: 1.3 CM2/M2
ECHO AV PEAK GRADIENT: 8 MMHG
ECHO AV PEAK VELOCITY: 1.4 M/S
ECHO AV VELOCITY RATIO: 0.71
ECHO BSA: 2.05 M2
ECHO BSA: 2.05 M2
ECHO BSA: 2.12 M2
ECHO LA DIAMETER INDEX: 1.73 CM/M2
ECHO LA DIAMETER: 3.6 CM
ECHO LA TO AORTIC ROOT RATIO: 0.97
ECHO LA VOL A-L A2C: 62 ML (ref 18–58)
ECHO LA VOL A-L A4C: 48 ML (ref 18–58)
ECHO LA VOL BP: 50 ML (ref 18–58)
ECHO LA VOL MOD A2C: 55 ML (ref 18–58)
ECHO LA VOL MOD A4C: 43 ML (ref 18–58)
ECHO LA VOL/BSA BIPLANE: 24 ML/M2 (ref 16–34)
ECHO LA VOLUME AREA LENGTH: 57 ML
ECHO LA VOLUME INDEX A-L A2C: 30 ML/M2 (ref 16–34)
ECHO LA VOLUME INDEX A-L A4C: 23 ML/M2 (ref 16–34)
ECHO LA VOLUME INDEX AREA LENGTH: 27 ML/M2 (ref 16–34)
ECHO LA VOLUME INDEX MOD A2C: 26 ML/M2 (ref 16–34)
ECHO LA VOLUME INDEX MOD A4C: 21 ML/M2 (ref 16–34)
ECHO LV E' LATERAL VELOCITY: 8 CM/S
ECHO LV E' SEPTAL VELOCITY: 6 CM/S
ECHO LV FRACTIONAL SHORTENING: 32 % (ref 28–44)
ECHO LV INTERNAL DIMENSION DIASTOLE INDEX: 2.26 CM/M2
ECHO LV INTERNAL DIMENSION DIASTOLIC: 4.7 CM (ref 4.2–5.9)
ECHO LV INTERNAL DIMENSION SYSTOLIC INDEX: 1.54 CM/M2
ECHO LV INTERNAL DIMENSION SYSTOLIC: 3.2 CM
ECHO LV IVSD: 1.3 CM (ref 0.6–1)
ECHO LV MASS 2D: 187.5 G (ref 88–224)
ECHO LV MASS INDEX 2D: 90.2 G/M2 (ref 49–115)
ECHO LV POSTERIOR WALL DIASTOLIC: 0.9 CM (ref 0.6–1)
ECHO LV RELATIVE WALL THICKNESS RATIO: 0.38
ECHO LVOT AREA: 3.8 CM2
ECHO LVOT DIAM: 2.2 CM
ECHO LVOT PEAK GRADIENT: 4 MMHG
ECHO LVOT PEAK VELOCITY: 1 M/S
ECHO MV A VELOCITY: 0.79 M/S
ECHO MV E DECELERATION TIME (DT): 236.7 MS
ECHO MV E VELOCITY: 0.85 M/S
ECHO MV E/A RATIO: 1.08
ECHO MV E/E' LATERAL: 10.63
ECHO MV E/E' RATIO (AVERAGED): 12.4
ECHO PV MAX VELOCITY: 0.9 M/S
ECHO PV PEAK GRADIENT: 3 MMHG
ECHO RV FREE WALL PEAK S': 14 CM/S
ECHO RV TAPSE: 1.3 CM (ref 1.7–?)
ECHO TV REGURGITANT MAX VELOCITY: 3.1 M/S
ECHO TV REGURGITANT PEAK GRADIENT: 38 MMHG
EKG ATRIAL RATE: 61 BPM
EKG DIAGNOSIS: NORMAL
EKG P AXIS: 10 DEGREES
EKG P-R INTERVAL: 164 MS
EKG Q-T INTERVAL: 400 MS
EKG QRS DURATION: 86 MS
EKG QTC CALCULATION (BAZETT): 402 MS
EKG R AXIS: -42 DEGREES
EKG T AXIS: 53 DEGREES
EKG VENTRICULAR RATE: 61 BPM
EOSINOPHIL # BLD: 0.2 K/UL (ref 0–0.4)
EOSINOPHIL NFR BLD: 2 % (ref 0–5)
ERYTHROCYTE [DISTWIDTH] IN BLOOD BY AUTOMATED COUNT: 11.9 % (ref 11.6–14.5)
GLUCOSE SERPL-MCNC: 116 MG/DL (ref 74–99)
HCT VFR BLD AUTO: 39.8 % (ref 36–48)
HDLC SERPL-MCNC: 44 MG/DL (ref 40–60)
HDLC SERPL: 2.7 (ref 0–5)
HGB BLD-MCNC: 13.9 G/DL (ref 13–16)
IMM GRANULOCYTES # BLD AUTO: 0 K/UL (ref 0–0.04)
IMM GRANULOCYTES NFR BLD AUTO: 0 % (ref 0–0.5)
LDLC SERPL CALC-MCNC: 52.6 MG/DL (ref 0–100)
LIPID PANEL: NORMAL
LYMPHOCYTES # BLD: 1.3 K/UL (ref 0.9–3.6)
LYMPHOCYTES NFR BLD: 13 % (ref 21–52)
MAGNESIUM SERPL-MCNC: 2.1 MG/DL (ref 1.6–2.6)
MCH RBC QN AUTO: 32.6 PG (ref 24–34)
MCHC RBC AUTO-ENTMCNC: 34.9 G/DL (ref 31–37)
MCV RBC AUTO: 93.4 FL (ref 78–100)
MONOCYTES # BLD: 1 K/UL (ref 0.05–1.2)
MONOCYTES NFR BLD: 10 % (ref 3–10)
NEUTS SEG # BLD: 7.7 K/UL (ref 1.8–8)
NEUTS SEG NFR BLD: 75 % (ref 40–73)
NRBC # BLD: 0 K/UL (ref 0–0.01)
NRBC BLD-RTO: 0 PER 100 WBC
PLATELET # BLD AUTO: 258 K/UL (ref 135–420)
PMV BLD AUTO: 8.9 FL (ref 9.2–11.8)
POTASSIUM SERPL-SCNC: 3.6 MMOL/L (ref 3.5–5.5)
RBC # BLD AUTO: 4.26 M/UL (ref 4.35–5.65)
SODIUM SERPL-SCNC: 141 MMOL/L (ref 136–145)
TRIGL SERPL-MCNC: 102 MG/DL
VLDLC SERPL CALC-MCNC: 20.4 MG/DL
WBC # BLD AUTO: 10.3 K/UL (ref 4.6–13.2)

## 2024-02-01 PROCEDURE — 6370000000 HC RX 637 (ALT 250 FOR IP)

## 2024-02-01 PROCEDURE — 99152 MOD SED SAME PHYS/QHP 5/>YRS: CPT | Performed by: INTERNAL MEDICINE

## 2024-02-01 PROCEDURE — 85730 THROMBOPLASTIN TIME PARTIAL: CPT

## 2024-02-01 PROCEDURE — C1713 ANCHOR/SCREW BN/BN,TIS/BN: HCPCS | Performed by: INTERNAL MEDICINE

## 2024-02-01 PROCEDURE — C1887 CATHETER, GUIDING: HCPCS | Performed by: INTERNAL MEDICINE

## 2024-02-01 PROCEDURE — 97530 THERAPEUTIC ACTIVITIES: CPT

## 2024-02-01 PROCEDURE — 6360000002 HC RX W HCPCS: Performed by: INTERNAL MEDICINE

## 2024-02-01 PROCEDURE — 2580000003 HC RX 258

## 2024-02-01 PROCEDURE — 6370000000 HC RX 637 (ALT 250 FOR IP): Performed by: INTERNAL MEDICINE

## 2024-02-01 PROCEDURE — 94761 N-INVAS EAR/PLS OXIMETRY MLT: CPT

## 2024-02-01 PROCEDURE — C9600 PERC DRUG-EL COR STENT SING: HCPCS | Performed by: INTERNAL MEDICINE

## 2024-02-01 PROCEDURE — C1725 CATH, TRANSLUMIN NON-LASER: HCPCS | Performed by: INTERNAL MEDICINE

## 2024-02-01 PROCEDURE — 93005 ELECTROCARDIOGRAM TRACING: CPT | Performed by: INTERNAL MEDICINE

## 2024-02-01 PROCEDURE — 2709999900 HC NON-CHARGEABLE SUPPLY: Performed by: INTERNAL MEDICINE

## 2024-02-01 PROCEDURE — C1894 INTRO/SHEATH, NON-LASER: HCPCS | Performed by: INTERNAL MEDICINE

## 2024-02-01 PROCEDURE — 93010 ELECTROCARDIOGRAM REPORT: CPT | Performed by: INTERNAL MEDICINE

## 2024-02-01 PROCEDURE — 99153 MOD SED SAME PHYS/QHP EA: CPT | Performed by: INTERNAL MEDICINE

## 2024-02-01 PROCEDURE — 36415 COLL VENOUS BLD VENIPUNCTURE: CPT

## 2024-02-01 PROCEDURE — 76770 US EXAM ABDO BACK WALL COMP: CPT

## 2024-02-01 PROCEDURE — 2140000001 HC CVICU INTERMEDIATE R&B

## 2024-02-01 PROCEDURE — 99255 IP/OBS CONSLTJ NEW/EST HI 80: CPT | Performed by: INTERNAL MEDICINE

## 2024-02-01 PROCEDURE — 6360000002 HC RX W HCPCS

## 2024-02-01 PROCEDURE — 93306 TTE W/DOPPLER COMPLETE: CPT

## 2024-02-01 PROCEDURE — 97162 PT EVAL MOD COMPLEX 30 MIN: CPT

## 2024-02-01 PROCEDURE — 85025 COMPLETE CBC W/AUTO DIFF WBC: CPT

## 2024-02-01 PROCEDURE — 2580000003 HC RX 258: Performed by: INTERNAL MEDICINE

## 2024-02-01 PROCEDURE — 83735 ASSAY OF MAGNESIUM: CPT

## 2024-02-01 PROCEDURE — 6370000000 HC RX 637 (ALT 250 FOR IP): Performed by: HOSPITALIST

## 2024-02-01 PROCEDURE — B2151ZZ FLUOROSCOPY OF LEFT HEART USING LOW OSMOLAR CONTRAST: ICD-10-PCS | Performed by: INTERNAL MEDICINE

## 2024-02-01 PROCEDURE — 2500000003 HC RX 250 WO HCPCS: Performed by: INTERNAL MEDICINE

## 2024-02-01 PROCEDURE — 4A023N7 MEASUREMENT OF CARDIAC SAMPLING AND PRESSURE, LEFT HEART, PERCUTANEOUS APPROACH: ICD-10-PCS | Performed by: INTERNAL MEDICINE

## 2024-02-01 PROCEDURE — 87449 NOS EACH ORGANISM AG IA: CPT

## 2024-02-01 PROCEDURE — 92928 PRQ TCAT PLMT NTRAC ST 1 LES: CPT | Performed by: INTERNAL MEDICINE

## 2024-02-01 PROCEDURE — 97535 SELF CARE MNGMENT TRAINING: CPT

## 2024-02-01 PROCEDURE — 6360000004 HC RX CONTRAST MEDICATION: Performed by: INTERNAL MEDICINE

## 2024-02-01 PROCEDURE — B2111ZZ FLUOROSCOPY OF MULTIPLE CORONARY ARTERIES USING LOW OSMOLAR CONTRAST: ICD-10-PCS | Performed by: INTERNAL MEDICINE

## 2024-02-01 PROCEDURE — 76000 FLUOROSCOPY <1 HR PHYS/QHP: CPT | Performed by: INTERNAL MEDICINE

## 2024-02-01 PROCEDURE — 80061 LIPID PANEL: CPT

## 2024-02-01 PROCEDURE — 93306 TTE W/DOPPLER COMPLETE: CPT | Performed by: INTERNAL MEDICINE

## 2024-02-01 PROCEDURE — C1769 GUIDE WIRE: HCPCS | Performed by: INTERNAL MEDICINE

## 2024-02-01 PROCEDURE — 93970 EXTREMITY STUDY: CPT | Performed by: STUDENT IN AN ORGANIZED HEALTH CARE EDUCATION/TRAINING PROGRAM

## 2024-02-01 PROCEDURE — C1874 STENT, COATED/COV W/DEL SYS: HCPCS | Performed by: INTERNAL MEDICINE

## 2024-02-01 PROCEDURE — 87324 CLOSTRIDIUM AG IA: CPT

## 2024-02-01 PROCEDURE — 93452 LEFT HRT CATH W/VENTRCLGRPHY: CPT | Performed by: INTERNAL MEDICINE

## 2024-02-01 PROCEDURE — 80048 BASIC METABOLIC PNL TOTAL CA: CPT

## 2024-02-01 PROCEDURE — 97165 OT EVAL LOW COMPLEX 30 MIN: CPT

## 2024-02-01 PROCEDURE — 027034Z DILATION OF CORONARY ARTERY, ONE ARTERY WITH DRUG-ELUTING INTRALUMINAL DEVICE, PERCUTANEOUS APPROACH: ICD-10-PCS | Performed by: INTERNAL MEDICINE

## 2024-02-01 PROCEDURE — 93458 L HRT ARTERY/VENTRICLE ANGIO: CPT | Performed by: INTERNAL MEDICINE

## 2024-02-01 PROCEDURE — 87507 IADNA-DNA/RNA PROBE TQ 12-25: CPT

## 2024-02-01 PROCEDURE — 99232 SBSQ HOSP IP/OBS MODERATE 35: CPT | Performed by: INTERNAL MEDICINE

## 2024-02-01 PROCEDURE — 93970 EXTREMITY STUDY: CPT

## 2024-02-01 DEVICE — XIENCE SIERRA™ EVEROLIMUS ELUTING CORONARY STENT SYSTEM 2.75 MM X 38 MM / RAPID-EXCHANGE
Type: IMPLANTABLE DEVICE | Status: FUNCTIONAL
Brand: XIENCE SIERRA™

## 2024-02-01 RX ORDER — HEPARIN SODIUM 200 [USP'U]/100ML
INJECTION, SOLUTION INTRAVENOUS CONTINUOUS PRN
Status: COMPLETED | OUTPATIENT
Start: 2024-02-01 | End: 2024-02-01

## 2024-02-01 RX ORDER — MIDAZOLAM HYDROCHLORIDE 1 MG/ML
INJECTION INTRAMUSCULAR; INTRAVENOUS PRN
Status: DISCONTINUED | OUTPATIENT
Start: 2024-02-01 | End: 2024-02-01 | Stop reason: HOSPADM

## 2024-02-01 RX ORDER — FENTANYL CITRATE 50 UG/ML
INJECTION, SOLUTION INTRAMUSCULAR; INTRAVENOUS PRN
Status: DISCONTINUED | OUTPATIENT
Start: 2024-02-01 | End: 2024-02-01 | Stop reason: HOSPADM

## 2024-02-01 RX ORDER — BIVALIRUDIN 250 MG/5ML
INJECTION, POWDER, LYOPHILIZED, FOR SOLUTION INTRAVENOUS PRN
Status: DISCONTINUED | OUTPATIENT
Start: 2024-02-01 | End: 2024-02-01 | Stop reason: HOSPADM

## 2024-02-01 RX ORDER — HYDRALAZINE HYDROCHLORIDE 20 MG/ML
5 INJECTION INTRAMUSCULAR; INTRAVENOUS EVERY 6 HOURS PRN
Status: DISCONTINUED | OUTPATIENT
Start: 2024-02-01 | End: 2024-02-02 | Stop reason: HOSPADM

## 2024-02-01 RX ORDER — METOPROLOL SUCCINATE 25 MG/1
25 TABLET, EXTENDED RELEASE ORAL DAILY
Status: DISCONTINUED | OUTPATIENT
Start: 2024-02-01 | End: 2024-02-02 | Stop reason: HOSPADM

## 2024-02-01 RX ORDER — HEPARIN SODIUM 1000 [USP'U]/ML
INJECTION, SOLUTION INTRAVENOUS; SUBCUTANEOUS PRN
Status: DISCONTINUED | OUTPATIENT
Start: 2024-02-01 | End: 2024-02-01 | Stop reason: HOSPADM

## 2024-02-01 RX ORDER — NITROGLYCERIN 20 MG/100ML
INJECTION INTRAVENOUS PRN
Status: DISCONTINUED | OUTPATIENT
Start: 2024-02-01 | End: 2024-02-01 | Stop reason: HOSPADM

## 2024-02-01 RX ADMIN — FAMOTIDINE 20 MG: 20 TABLET ORAL at 08:49

## 2024-02-01 RX ADMIN — HYDRALAZINE HYDROCHLORIDE 5 MG: 20 INJECTION INTRAMUSCULAR; INTRAVENOUS at 23:24

## 2024-02-01 RX ADMIN — SODIUM CHLORIDE, PRESERVATIVE FREE 10 ML: 5 INJECTION INTRAVENOUS at 20:38

## 2024-02-01 RX ADMIN — GABAPENTIN 600 MG: 300 CAPSULE ORAL at 20:36

## 2024-02-01 RX ADMIN — HYDRALAZINE HYDROCHLORIDE 5 MG: 20 INJECTION INTRAMUSCULAR; INTRAVENOUS at 00:29

## 2024-02-01 RX ADMIN — ARIPIPRAZOLE 5 MG: 5 TABLET ORAL at 08:49

## 2024-02-01 RX ADMIN — DILTIAZEM HYDROCHLORIDE 180 MG: 180 CAPSULE, COATED, EXTENDED RELEASE ORAL at 08:49

## 2024-02-01 RX ADMIN — TAMSULOSIN HYDROCHLORIDE 0.8 MG: 0.4 CAPSULE ORAL at 08:49

## 2024-02-01 RX ADMIN — ATORVASTATIN CALCIUM 40 MG: 40 TABLET, FILM COATED ORAL at 08:49

## 2024-02-01 RX ADMIN — ASPIRIN 81 MG: 81 TABLET, COATED ORAL at 08:49

## 2024-02-01 RX ADMIN — NITROGLYCERIN 0.4 MG: 0.4 TABLET, ORALLY DISINTEGRATING SUBLINGUAL at 17:20

## 2024-02-01 RX ADMIN — TICAGRELOR 90 MG: 90 TABLET ORAL at 20:36

## 2024-02-01 RX ADMIN — AMLODIPINE BESYLATE 5 MG: 5 TABLET ORAL at 08:49

## 2024-02-01 RX ADMIN — NITROGLYCERIN 0.4 MG: 0.4 TABLET, ORALLY DISINTEGRATING SUBLINGUAL at 17:27

## 2024-02-01 RX ADMIN — PAROXETINE HYDROCHLORIDE 40 MG: 20 TABLET, FILM COATED ORAL at 08:49

## 2024-02-01 RX ADMIN — GABAPENTIN 600 MG: 300 CAPSULE ORAL at 08:49

## 2024-02-01 RX ADMIN — NITROGLYCERIN 0.4 MG: 0.4 TABLET, ORALLY DISINTEGRATING SUBLINGUAL at 17:33

## 2024-02-01 RX ADMIN — HEPARIN SODIUM 4000 UNITS: 1000 INJECTION INTRAVENOUS; SUBCUTANEOUS at 04:28

## 2024-02-01 ASSESSMENT — PAIN SCALES - GENERAL
PAINLEVEL_OUTOF10: 0

## 2024-02-01 ASSESSMENT — PAIN - FUNCTIONAL ASSESSMENT: PAIN_FUNCTIONAL_ASSESSMENT: NONE - DENIES PAIN

## 2024-02-01 NOTE — PROGRESS NOTES
Advance Care Planning     Advance Care Planning Inpatient Note  Spiritual Care Department    Today's Date: 2/1/2024  Unit: [facility]    Received request from admission screening.  Upon review of chart and communication with care team, patient's decision making abilities are not in question.. Patient was/were present in the room during visit.    Health Care Decision Makers:     No healthcare decision makers have been documented.  Click here to complete HealthCare Decision Makers including selection of the Healthcare Decision Maker Relationship (ie \"Primary\")  Summary:  Documented Next of Kin, per patient report    Eliza Dumont Spouse          Home Phone: [phone]        Advance Care Planning Documents (Patient Wishes):  None     Assessment:     02/01/24 1026   Encounter Summary   Encounter Overview/Reason  Initial Encounter   Service Provided For: Patient   Referral/Consult From: Rounding   Support System Spouse;Family members   Last Encounter  02/01/24  (IV-SA-KP)   Complexity of Encounter Moderate   Begin Time 1010   End Time  1017   Total Time Calculated 7 min   Spiritual/Emotional needs   Type Spiritual Support   Advance Care Planning   Type   (legal next of kin)   Assessment/Intervention/Outcome   Assessment Coping   Intervention Active listening;Nurtured Hope   Outcome Encouraged   Plan and Referrals   Plan/Referrals Continue to visit, (comment)         Interventions:  Reviewed but did not complete ACP document    Care Preferences Communicated:   No    Outcomes/Plan:  ACP Discussion: Postponed    Electronically signed by SHELIA Clark on 2/1/2024 at 10:28 AM

## 2024-02-01 NOTE — PROGRESS NOTES
TRANSFER - IN REPORT:    Verbal report received from [facility]  on Salvador Tim  being received from [facility] for routine post-op      Report consisted of patient's Situation, Background, Assessment and   Recommendations(SBAR).     Information from the following report(s) Nurse Handoff Report, MAR, Cardiac Rhythm NSR, Neuro Assessment, and Event Log was reviewed with the receiving nurse.    Opportunity for questions and clarification was provided.      Assessment completed upon patient's arrival to unit and care assumed.

## 2024-02-01 NOTE — PROGRESS NOTES
[facility]  Progress Note    Patient: Salvador Dumont Age: 78 y.o. : 1945 MR#: [id number] SSN:   Date/Time: 2024     Subjective:   Mr. Salvador Dumont is a a 78 year old male with CC of sudden onset substernal chest pain last night and pmh of HTN, hyperlipidemia, bilateral upper and lower extremity neuropathy. He does not have any pain currently. He also endorsed dyspnea, nausea and syncope during the episode. He was watching tv and sitting on the couch at time of onset and describes his chest pain as sharp and radiating down bilateral arms. He took 2 aspirin at home and was given a dose of nitroglycerin by EMS which resolved his chest pain. He reports no prior similar episode and no history of heart disease. He has no prior history of DVT or PE and stress echo  was unremarkable. He denies headache, N/V/D, and no changes in urination. He stated that he has edema and pain in bilateral legs x 3 weeks. Nothing makes it better. He denies history of HF and diabetes.. Mom has history of stroke, has family history of diabetes. He denies tobacco, alcohol, or illicit drug use. Pt states he is able to ambulate at home and only has dyspnea on heavy exertion.      CTA - no sign of PE   Vascular duplex of LE - no sign of DVT      Review of systems  General: No fevers or chills.  Cardiovascular: No chest pain or pressure. No palpitations.   Pulmonary: No shortness of breath, cough or wheeze.   Gastrointestinal: No abdominal pain, nausea, vomiting or diarrhea.   Genitourinary: No urinary frequency, urgency, hesitancy or dysuria.   Musculoskeletal: No joint or muscle pain, no back pain, no recent trauma.    Neurologic: No headache, has numbness and tingling in bilateral upper and LE.   Assessment/Plan:     NSTEMI -  EKG showed no ST elevations, elevated troponin consistent with Acute Coronary Syndrome. Symptoms resolved post nitro. C/t heparin drip until

## 2024-02-01 NOTE — PLAN OF CARE
Problem: Physical Therapy - Adult  Goal: By Discharge: Performs mobility at highest level of function for planned discharge setting.  See evaluation for individualized goals.  Description: Initiated  2/1/2024  to be met within 7-10 days.    1.  Patient will move from supine to sit and sit to supine , scoot up and down, and roll side to side in bed with independence.    2.  Patient will transfer from bed to chair and chair to bed with independence.  3.  Patient will perform sit to stand with independence.  4.  Patient will ambulate with independence for 150 feet with the least restrictive device.   5.  Patient will ascend/descend 5 stairs with b/l handrail(s) with modified independence.    PLOF: Pt lives with Wife in 3 story home, bed/bath on 2nd and 3rd floor, 7 steps to enter with handrail.  Pt was independent with all gait and mobility, owns a SPC    Outcome: Progressing   PHYSICAL THERAPY EVALUATION    Patient: Salvador Dumont (78 y.o. male)  Date: 2/1/2024  Primary Diagnosis: Chronic prostatitis [N41.1]  NSTEMI (non-ST elevated myocardial infarction) (HCC) [I21.4]  Procedure(s) (LRB):  Left heart cath (N/A)  Left ventriculography (N/A)  Coronary angiography (N/A) Day of Surgery   Precautions: Fall Risk,    ASSESSMENT :  Pt resting in bed upon entering room for PT evaluation, agreeable to treatment.  Co-tx with OT to maximize participation and increase safety during functional mobility.  Pt was Supervision for all bed mobility, SBA with sit to stand and ambulation throughout room for ~ 20 feet.  Pt performed toilet transfer and self care standing at sink with OT with SBA.  Once finished stayed up in recliner, all needs met, call button within reach and tray table by his side.  Educated pt to use call button to call RN if needing to go to the bathroom or return to bed and pt verbalized understanding.     DEFICITS/IMPAIRMENTS:    , Body Structures, Functions, Activity Limitations Requiring Skilled Therapeutic  Assistance: Stand-by assistance  Distance (ft): 20 Feet  Assistive Device: None  Base of Support: Widened  Speed/Kadi: Slow;Shuffled  Step Length: Left shortened;Right shortened  Stance: Left decreased;Right decreased  Gait Abnormalities: Shuffling gait                 Therapeutic Exercises/Neuromuscular Re-education:     Pain:  Pain level pre-treatment: 8/10   Pain level post-treatment: 8/10   Pain Intervention(s): Medication (see MAR); Rest, Repositioning  Response to intervention: Nurse notified     Activity Tolerance:   Activity Tolerance: Patient tolerated evaluation without incident  Please refer to the flowsheet for vital signs taken during this treatment.    After treatment:   [x]         Patient left in no apparent distress sitting up in chair  []         Patient left in no apparent distress in bed  [x]         Call bell left within reach  [x]         Nursing notified  []         Caregiver present  []         Bed alarm activated  []         SCDs applied    COMMUNICATION/EDUCATION:   Patient Education  Education Given To: Patient  Education Provided: Role of Therapy;Plan of Care;Transfer Training  Education Method: Verbal;Teach Back  Barriers to Learning: None  Education Outcome: Verbalized understanding;Demonstrated understanding    Thank you for this referral.  Agatha Bains, PT  Minutes: 28      Eval Complexity: Decision Making: Medium Complexity

## 2024-02-01 NOTE — PROGRESS NOTES
[facility]  Rapid/Medical Response Team Note      Patient:    Salvador Dumont 78 y.o. male, : 1945  Patient MRN: [id number]    Admission Date: 2024   Admission Diagnosis: Chronic prostatitis [N41.1]  NSTEMI (non-ST elevated myocardial infarction) (HCC) [I21.4]      RAPID RESPONSE  Rapid response called for: Chest pain secondary to cardiac cath 6 hours prior.  Patient began having chest pain 20 minutes before response.  It was located in the left shoulder nonradiating no numbness going down the arm.  No point tenderness.  Just constant consistent chest/shoulder pressure.  Patient was only slightly short of breath at the time of questioning.    OBJECTIVE    RRT/MRT start time:               RRT/MRT end time:  Vitals:    24 1720   BP: 120/85   Pulse: 75   Resp:    Temp:    SpO2:         Physical Exam:  Gen: NAD, comfortable, obese   HEENT: normocephalic, atraumatic, MMM, no thyromegaly, no JVD  CV: RRR, S1/S2 present without M/R/G, +2 radial pulses, well-perfused, PMI not displaced  Pulm: CTAB, no wheezes, no crackles  Abd: S/NT/ND, no rebound, no guarding, no hepatosplenomegaly   MSK: no clubbing, no edema, left shoulder pain/chest pain  Skin: warm, dry, intact, no rash  Neuro: CN II-XII grossly intact, no focal deficits appreciated   Psych: appropriate, alert, oriented x3      ASSESSMENT/PLAN AND DISPOSITION  Salvador Dumont is a 78 y.o. year old male admitted for Chronic prostatitis [N41.1]  NSTEMI (non-ST elevated myocardial infarction) (HCC) [I21.4]  Rapid Response Team/ Medical Response Team Called For: Chest pain/pressure    In setting of cardiac catheterization for CAD, hypertension, hyperlipidemia completed the below:    Medications Administered During Rapid:  None administered during this rapid, nitro x 3 prior to rapid being called with no resolution    EKG: Sinus rhythm with new occasional premature ventricular complexes left anterior fascicular block, septal infarct age  undetermined that was on prior EKG taken 1/31. Nonconcerning for ACS at this time if anything shows improvement.     Labs: CBC, CMP, proBNP, troponin ordered at this time pending    Imaging:       > CXR: None    Other interventions: Of note, patient recently started aspirin and Brilinta status post cardiac cath.  Patient reassured that this is a common presentation when starting Brilinta after cardiac cath.  EKG was reassuring gives me less suspicion for infarct at this time.      Medical Problem(s)  Chest/shoulder pressure status post cardiac cath and administration of Brilinta plan:  -Patient received 3 doses of nitroglycerin  -Currently has anticoagulation on board with aspirin and Brilinta  -No previous history of heart attack or stroke  -Patient's chest pressure improved at the end of the rapid, shortness of breath improved as well  -EKG was reassuring  -Cardiac labs pending review  -Hospitalist updated  -Will sign off at this time    Disposition: Maintain level  Patient condition: stable      Attending  notified of rapid response and is in agreement with plan.     Primary team resuming care.     [facility] Senior resident Dr. Tolliver present during Rapid Response.        Scot Tolliver, DO -PGY 2  [facility]  February 1, 2024 5:54 PM

## 2024-02-01 NOTE — ED NOTES
Bedside shift report completed with JOSE A Beauchamp.  Heparin drip ordered. Repeat troponin drawn and second IV started with medication administered per MAR.  Vital signs updated.  Pt denies any  needs at this time.   Call bell within reach.  Full cardiac monitor continued

## 2024-02-01 NOTE — CONSULTS
[facility] - Consult Note    Cardiology consultation request from ED MD for evaluation and management/treatment of Elevated troponin, chest pain    Date of  Admission: 1/31/2024  5:15 PM   Primary Care Physician:  Billy Davies MD    Attending Cardiologist: Dr. Real       Assessment:     Patient Active Problem List    Diagnosis Date Noted    FLOR (acute kidney injury) ([facility]) 01/31/2024    BPH (benign prostatic hyperplasia) 01/31/2024    Hypertension 01/31/2024    Hyperlipidemia 01/31/2024    Neuropathy 01/31/2024    Anxiety 01/31/2024    Depression 01/31/2024    NSTEMI (non-ST elevated myocardial infarction) ([facility]) 01/31/2024    Erectile dysfunction 03/23/2012    Chronic prostatitis 03/23/2012       - Chest pain with elevated troponin concerning for ACS. Trop 321 > 1993. EKG with 1mm ST elevation in V2-V5, new change from prior EKG 9/2020. Currently, patient is chest pain free. On heparin infusion and PO asa.  - FLOR with CKD stage 3A: On IV fluids. Cr currently 1.5   - HTN  - HLD  - History of SVT: on PO diltiazem  - T2DM  - PVD  - Anxiety/depression  - BPH  - GERD      Primary cardiologist: None on file, initial consult Dr. Real     Plan:     Addendum: Independently seen and evaluated.  Agree with below.  Patient has chest pains which are suspicious for ACS.  His troponin is elevated.  He does have stage III CKD.  Will proceed with coronary angiography.  All questions answered.  He agrees with the plan.    - Cardiac catheterization planned for today to evaluate coronary anatomy. Patient currently NPO except for sips of water with meds  - Continue IV heparin  - Echo ordered. Will review images once available.      History of Present Illness:     This is a 78 y.o. male admitted for Chronic prostatitis [N41.1]  NSTEMI (non-ST elevated myocardial infarction) ([facility]) [I21.4].    Patient with pmhx as stated above presented to [facility] with acute substernal chest pain 10/10 radiating down both arms. Patient took     R Axis -42    T Axis 53    Diagnosis      Normal sinus rhythm  Left axis deviation  Moderate voltage criteria for LVH, may be normal variant  Cannot rule out Septal infarct , age undetermined  Abnormal ECG  When compared with ECG of 23-SEP-2020 10:19,  T wave amplitude has increased in Lateral leads       No results found for this or any previous visit.    No results found for this or any previous visit.    No results found for this or any previous visit.    Xray Result (most recent):  XR CHEST STANDARD TWO VW 03/10/2022    Narrative  Indication: Cough for 2 months  PA and lateral chest    Heart normal size. Mild interstitial thickening little changed from 2/21/2019  without consolidation. Normal lung volumes. No effusion or bony lesion.    Impression  IMPRESSION: Mild interstitial thickening and chronic. No acute abnormality seen.      Signed By: Nuvia Ang, APRN - NP     February 1, 2024

## 2024-02-01 NOTE — PLAN OF CARE
Problem: Skin/Tissue Integrity  Goal: Absence of new skin breakdown  Description: 1.  Monitor for areas of redness and/or skin breakdown  2.  Assess vascular access sites hourly  3.  Every 4-6 hours minimum:  Change oxygen saturation probe site  4.  Every 4-6 hours:  If on nasal continuous positive airway pressure, respiratory therapy assess nares and determine need for appliance change or resting period.  Outcome: Progressing     Problem: ABCDS Injury Assessment  Goal: Absence of physical injury  Outcome: Progressing     Problem: Safety - Adult  Goal: Free from fall injury  Outcome: Progressing  Flowsheets (Taken 2/1/2024 1517)  Free From Fall Injury: Based on caregiver fall risk screen, instruct family/caregiver to ask for assistance with transferring infant if caregiver noted to have fall risk factors     Problem: Pain  Goal: Verbalizes/displays adequate comfort level or baseline comfort level  Outcome: Progressing     Problem: Chronic Conditions and Co-morbidities  Goal: Patient's chronic conditions and co-morbidity symptoms are monitored and maintained or improved  Outcome: Progressing  Flowsheets (Taken 2/1/2024 1500)  Care Plan - Patient's Chronic Conditions and Co-Morbidity Symptoms are Monitored and Maintained or Improved:   Monitor and assess patient's chronic conditions and comorbid symptoms for stability, deterioration, or improvement   Collaborate with multidisciplinary team to address chronic and comorbid conditions and prevent exacerbation or deterioration   Update acute care plan with appropriate goals if chronic or comorbid symptoms are exacerbated and prevent overall improvement and discharge     Problem: Physical Therapy - Adult  Goal: By Discharge: Performs mobility at highest level of function for planned discharge setting.  See evaluation for individualized goals.  Description: Initiated  2/1/2024  to be met within 7-10 days.    1.  Patient will move from supine to sit and sit to supine ,  appropriate resources:   Identify barriers to discharge with patient and caregiver   Arrange for needed discharge resources and transportation as appropriate

## 2024-02-01 NOTE — PROGRESS NOTES
TRANSFER - OUT REPORT:    Verbal report given to Margy NARANJO on Salvador Dumont  being transferred to [facility] for ordered procedure       Report consisted of patient's Situation, Background, Assessment and   Recommendations(SBAR).     Information from the following report(s) Nurse Handoff Report, Adult Overview, Intake/Output, MAR, Recent Results, Cardiac Rhythm NSR, Pre Procedure Checklist, Procedure Verification, and Neuro Assessment was reviewed with the receiving nurse.           Lines:   Peripheral IV 01/31/24 Left Antecubital (Active)   Site Assessment Clean, dry & intact 01/31/24 2230   Line Status Flushed;Infusing 01/31/24 2230   Line Care Connections checked and tightened 01/31/24 2230   Phlebitis Assessment No symptoms 01/31/24 2230   Infiltration Assessment 0 01/31/24 2230   Alcohol Cap Used Yes 01/31/24 2230   Dressing Status Clean, dry & intact 01/31/24 2230   Dressing Type Transparent 01/31/24 2230       Peripheral IV 01/31/24 Posterior;Right Forearm (Active)   Site Assessment Clean, dry & intact 01/31/24 2230   Line Status Flushed;Infusing 01/31/24 2230   Line Care Connections checked and tightened 01/31/24 2230   Phlebitis Assessment No symptoms 01/31/24 2230   Infiltration Assessment 0 01/31/24 2230   Alcohol Cap Used Yes 01/31/24 2230   Dressing Status Clean, dry & intact 01/31/24 2230   Dressing Type Transparent 01/31/24 2230        Opportunity for questions and clarification was provided.      Patient transported with:  Registered Nurse

## 2024-02-01 NOTE — PROGRESS NOTES
Patient complaints of SOB and chest pain 3/10. Patient denies pain radiating from chest at this time. Provider made aware. Nasal cannula placed on 2L and nitroglycerin tab administered.    Nitro x3 administered, pain unresolved, patient now complains of pain radiating down left arm. RRT team alerted. RRT team and provider at bedside to assess patient. No new orders at this time.

## 2024-02-01 NOTE — PLAN OF CARE
Problem: Occupational Therapy - Adult  Goal: By Discharge: Performs self-care activities at highest level of function for planned discharge setting.  See evaluation for individualized goals.  Description: Occupational Therapy Goals:  Initiated 2/1/2024 to be met within 7-10 days.    1.  Patient will perform grooming with modified independence while standing at the sink for > 5 min with Good balance.   2.  Patient will perform bathing with modified independence.  3.  Patient will perform lower body dressing with modified independence for seated and standing aspects.  4.  Patient will perform toilet transfers with modified independence.  5.  Patient will perform all aspects of toileting with modified independence.  6.  Patient will participate in upper extremity therapeutic exercise/activities with supervision/set-up for 8 minutes to improve endurance and UB strength needed for ADLs    7.  Patient will utilize energy conservation techniques during functional activities with verbal cues.    PLOF: Pt lives with spouse in [place], independent w/o AD.  Outcome: Progressing  OCCUPATIONAL THERAPY EVALUATION    Patient: Salvador Dumont (78 y.o. male)  Date: 2/1/2024  Primary Diagnosis: Chronic prostatitis [N41.1]  NSTEMI (non-ST elevated myocardial infarction) (HCC) [I21.4]  Procedure(s) (LRB):  Left heart cath (N/A)  Left ventriculography (N/A)  Coronary angiography (N/A) Day of Surgery   Precautions: Fall Risk    ASSESSMENT :    Pt is agreeable to OT evaluation this am. Pt performed functional mobility in room, followed by BR mobility for grooming at the sink. Pt completed mult standing grooming tasks with SBA, following which had an episode of BM incontinence, and required CGA for safety to sit down on the toilet. Pt performed all aspects of toileting, UB, LB dressing with SBA-CGA, following which was assisted to recliner, positioned for comfort.     DEFICITS/IMPAIRMENTS:  Performance deficits / Impairments: Decreased

## 2024-02-01 NOTE — PROGRESS NOTES
6038 Received report from JOSE A Gregg on patient post cardiac cath. DSTAT in place. Site WDL.    7106 TRANSFER - OUT REPORT:    Verbal report given to JOSE A Car on Salvador Dumont  being transferred to [facility] for routine progression of patient care       Report consisted of patient's Situation, Background, Assessment and   Recommendations(SBAR).     Information from the following report(s) Nurse Handoff Report, Surgery Report, Intake/Output, MAR, Recent Results, Med Rec Status, Cardiac Rhythm NSR, and Event Log was reviewed with the receiving nurse.           Lines:   Peripheral IV 01/31/24 Left Antecubital (Active)   Site Assessment Clean, dry & intact 01/31/24 2230   Line Status Flushed;Infusing 01/31/24 2230   Line Care Connections checked and tightened 01/31/24 2230   Phlebitis Assessment No symptoms 01/31/24 2230   Infiltration Assessment 0 01/31/24 2230   Alcohol Cap Used Yes 01/31/24 2230   Dressing Status Clean, dry & intact 01/31/24 2230   Dressing Type Transparent 01/31/24 2230       Peripheral IV 01/31/24 Posterior;Right Forearm (Active)   Site Assessment Clean, dry & intact 01/31/24 2230   Line Status Flushed;Infusing 01/31/24 2230   Line Care Connections checked and tightened 01/31/24 2230   Phlebitis Assessment No symptoms 01/31/24 2230   Infiltration Assessment 0 01/31/24 2230   Alcohol Cap Used Yes 01/31/24 2230   Dressing Status Clean, dry & intact 01/31/24 2230   Dressing Type Transparent 01/31/24 2230        Opportunity for questions and clarification was provided.

## 2024-02-01 NOTE — PROGRESS NOTES
[facility] Hospitalist Group  Progress Note    Patient: Salvador Dumont Age: 78 y.o. : 1945 MR#: [id number] SSN: [id number]  Date/Time: 2024    Subjective:   No acute events overnight     No chest pain, dyspnea, palpitations this AM. Pain was across the chest and radiated to the arms. Resolved with nitro. Lasted about 30 min. No clear provoking factors (watching TV when it started).     Assessment/Plan:   77 yo M with HTN and HLD presented with chest pain, found to have elevated troponin and rising, and admitted for NSTEMI. Cardiology took patient to cath lab 24 and placed ANA to LAD.    1. NSTEMI: consistent with ACS given risk factors and chest pain. No ischemic changes on ECG. Chest pain resolved with nitro. Cath showed long proximal to mid 90 to 95% stenosis in the LAD as the likely culprit. PCI with 2.75 mm ANA, 38 mm length. He also had 80% of the first OM (potential staged intervention in 2 to 4 weeks)  2. Elevated serum Cr: unclear acuity (most recent prior Cr 0.9 in )    Chronic conditions:  3. HTN  4. HLD: LDL goal < 100. At goal at 52 on admission  5. BPH s/p suprapubic prostatectomy in   6. Neuropathy: stable   7. Anxiety/depression: stable  8. History of nephrolithiasis    Plan:    -Cardiology consulted. Appreciate recs  -NPO except sips with meds for now  -Follow up TTE (completed 24)  -Continuous tele  -SL nitro PRN chest pain  -Continue heparin drip  -Continue ASA and Lipitor.   -Metoprolol and Brilinta started 24 per cardiology   -Monitor urine output and serum Cr   -Follow up renal US   -PT/OT following. Appreciate recs    Case discussed with:  [x]Patient  []Family  []Nursing  []Case Management  DVT Prophylaxis:  []Lovenox  []Hep SQ  []SCDs  []Coumadin   [x]On Heparin gtt    Objective:   VS: BP (!) 142/83   Pulse 73   Temp 98 °F (36.7 °C) (Oral)   Resp 24   Ht 1.753 m (5' 9\")   Wt 92.1 kg (203 lb)   SpO2 95%   BMI 29.98 kg/m²   Neutrophils % 83 (H) 40 - 73 %    Lymphocytes % 7 (L) 21 - 52 %    Monocytes % 8 3 - 10 %    Eosinophils % 2 0 - 5 %    Basophils % 0 0 - 2 %    Immature Granulocytes 0 0.0 - 0.5 %    Neutrophils Absolute 9.4 (H) 1.8 - 8.0 K/UL    Lymphocytes Absolute 0.9 0.9 - 3.6 K/UL    Monocytes Absolute 0.9 0.05 - 1.2 K/UL    Eosinophils Absolute 0.2 0.0 - 0.4 K/UL    Basophils Absolute 0.0 0.0 - 0.1 K/UL    Absolute Immature Granulocyte 0.0 0.00 - 0.04 K/UL    Differential Type AUTOMATED     Magnesium    Collection Time: 01/31/24  5:31 PM   Result Value Ref Range    Magnesium 2.3 1.6 - 2.6 mg/dL   Troponin    Collection Time: 01/31/24  5:31 PM   Result Value Ref Range    Troponin, High Sensitivity 321 (HH) 0 - 78 ng/L   Brain Natriuretic Peptide    Collection Time: 01/31/24  5:31 PM   Result Value Ref Range    NT Pro- 0 - 1,800 PG/ML   Troponin    Collection Time: 01/31/24  7:21 PM   Result Value Ref Range    Troponin, High Sensitivity 1,993 (HH) 0 - 78 ng/L   APTT    Collection Time: 02/01/24  1:42 AM   Result Value Ref Range    PTT 56.2 (H) 23.0 - 36.4 SEC   Vascular duplex lower extremity venous bilateral    Collection Time: 02/01/24  8:36 AM   Result Value Ref Range    Body Surface Area 2.05 m2   APTT    Collection Time: 02/01/24  8:46 AM   Result Value Ref Range    PTT 71.5 (H) 23.0 - 36.4 SEC   Basic Metabolic Panel w/ Reflex to MG    Collection Time: 02/01/24  8:46 AM   Result Value Ref Range    Sodium 141 136 - 145 mmol/L    Potassium 3.6 3.5 - 5.5 mmol/L    Chloride 114 (H) 100 - 111 mmol/L    CO2 19 (L) 21 - 32 mmol/L    Anion Gap 8 3.0 - 18 mmol/L    Glucose 116 (H) 74 - 99 mg/dL    BUN 15 7.0 - 18 MG/DL    Creatinine 1.53 (H) 0.6 - 1.3 MG/DL    Bun/Cre Ratio 10 (L) 12 - 20      Est, Glom Filt Rate 47 (L) >60 ml/min/1.73m2    Calcium 9.1 8.5 - 10.1 MG/DL   CBC with Auto Differential    Collection Time: 02/01/24  8:46 AM   Result Value Ref Range    WBC 10.3 4.6 - 13.2 K/uL    RBC 4.26 (L) 4.35 - 5.65 M/uL

## 2024-02-02 VITALS
OXYGEN SATURATION: 97 % | WEIGHT: 191.8 LBS | TEMPERATURE: 97.8 F | HEART RATE: 80 BPM | HEIGHT: 69 IN | RESPIRATION RATE: 21 BRPM | BODY MASS INDEX: 28.41 KG/M2 | SYSTOLIC BLOOD PRESSURE: 128 MMHG | DIASTOLIC BLOOD PRESSURE: 82 MMHG

## 2024-02-02 LAB
ANION GAP SERPL CALC-SCNC: 11 MMOL/L (ref 3–18)
BASOPHILS # BLD: 0.1 K/UL (ref 0–0.1)
BASOPHILS NFR BLD: 0 % (ref 0–2)
BUN SERPL-MCNC: 13 MG/DL (ref 7–18)
BUN/CREAT SERPL: 9 (ref 12–20)
C DIFF GDH STL QL: NEGATIVE
C DIFF TOX A+B STL QL IA: NEGATIVE
C DIFF TOXIN INTERPRETATION: NORMAL
CALCIUM SERPL-MCNC: 9.3 MG/DL (ref 8.5–10.1)
CHLORIDE SERPL-SCNC: 110 MMOL/L (ref 100–111)
CO2 SERPL-SCNC: 17 MMOL/L (ref 21–32)
CREAT SERPL-MCNC: 1.4 MG/DL (ref 0.6–1.3)
DIFFERENTIAL METHOD BLD: ABNORMAL
EKG ATRIAL RATE: 75 BPM
EKG ATRIAL RATE: 97 BPM
EKG DIAGNOSIS: NORMAL
EKG DIAGNOSIS: NORMAL
EKG P AXIS: -16 DEGREES
EKG P AXIS: 4 DEGREES
EKG P-R INTERVAL: 152 MS
EKG P-R INTERVAL: 160 MS
EKG Q-T INTERVAL: 388 MS
EKG Q-T INTERVAL: 390 MS
EKG QRS DURATION: 80 MS
EKG QRS DURATION: 88 MS
EKG QTC CALCULATION (BAZETT): 435 MS
EKG QTC CALCULATION (BAZETT): 492 MS
EKG R AXIS: -46 DEGREES
EKG R AXIS: -51 DEGREES
EKG T AXIS: 114 DEGREES
EKG T AXIS: 52 DEGREES
EKG VENTRICULAR RATE: 75 BPM
EKG VENTRICULAR RATE: 97 BPM
EOSINOPHIL # BLD: 0.1 K/UL (ref 0–0.4)
EOSINOPHIL NFR BLD: 0 % (ref 0–5)
ERYTHROCYTE [DISTWIDTH] IN BLOOD BY AUTOMATED COUNT: 11.8 % (ref 11.6–14.5)
GLUCOSE SERPL-MCNC: 127 MG/DL (ref 74–99)
HCT VFR BLD AUTO: 43.1 % (ref 36–48)
HGB BLD-MCNC: 15 G/DL (ref 13–16)
IMM GRANULOCYTES # BLD AUTO: 0.1 K/UL (ref 0–0.04)
IMM GRANULOCYTES NFR BLD AUTO: 1 % (ref 0–0.5)
LYMPHOCYTES # BLD: 0.9 K/UL (ref 0.9–3.6)
LYMPHOCYTES NFR BLD: 7 % (ref 21–52)
MAGNESIUM SERPL-MCNC: 2 MG/DL (ref 1.6–2.6)
MCH RBC QN AUTO: 32 PG (ref 24–34)
MCHC RBC AUTO-ENTMCNC: 34.8 G/DL (ref 31–37)
MCV RBC AUTO: 91.9 FL (ref 78–100)
MONOCYTES # BLD: 1.2 K/UL (ref 0.05–1.2)
MONOCYTES NFR BLD: 9 % (ref 3–10)
NEUTS SEG # BLD: 10.8 K/UL (ref 1.8–8)
NEUTS SEG NFR BLD: 83 % (ref 40–73)
NRBC # BLD: 0 K/UL (ref 0–0.01)
NRBC BLD-RTO: 0 PER 100 WBC
PLATELET # BLD AUTO: 304 K/UL (ref 135–420)
PMV BLD AUTO: 9.4 FL (ref 9.2–11.8)
POTASSIUM SERPL-SCNC: 3.1 MMOL/L (ref 3.5–5.5)
RBC # BLD AUTO: 4.69 M/UL (ref 4.35–5.65)
SODIUM SERPL-SCNC: 138 MMOL/L (ref 136–145)
WBC # BLD AUTO: 13.1 K/UL (ref 4.6–13.2)

## 2024-02-02 PROCEDURE — 85025 COMPLETE CBC W/AUTO DIFF WBC: CPT

## 2024-02-02 PROCEDURE — 2580000003 HC RX 258

## 2024-02-02 PROCEDURE — 99232 SBSQ HOSP IP/OBS MODERATE 35: CPT

## 2024-02-02 PROCEDURE — 99239 HOSP IP/OBS DSCHRG MGMT >30: CPT | Performed by: INTERNAL MEDICINE

## 2024-02-02 PROCEDURE — 6370000000 HC RX 637 (ALT 250 FOR IP)

## 2024-02-02 PROCEDURE — 99232 SBSQ HOSP IP/OBS MODERATE 35: CPT | Performed by: INTERNAL MEDICINE

## 2024-02-02 PROCEDURE — 93005 ELECTROCARDIOGRAM TRACING: CPT | Performed by: INTERNAL MEDICINE

## 2024-02-02 PROCEDURE — 6370000000 HC RX 637 (ALT 250 FOR IP): Performed by: INTERNAL MEDICINE

## 2024-02-02 PROCEDURE — 93010 ELECTROCARDIOGRAM REPORT: CPT | Performed by: INTERNAL MEDICINE

## 2024-02-02 PROCEDURE — 6370000000 HC RX 637 (ALT 250 FOR IP): Performed by: HOSPITALIST

## 2024-02-02 PROCEDURE — 36415 COLL VENOUS BLD VENIPUNCTURE: CPT

## 2024-02-02 PROCEDURE — 83735 ASSAY OF MAGNESIUM: CPT

## 2024-02-02 PROCEDURE — 80048 BASIC METABOLIC PNL TOTAL CA: CPT

## 2024-02-02 PROCEDURE — 94761 N-INVAS EAR/PLS OXIMETRY MLT: CPT

## 2024-02-02 RX ORDER — ASPIRIN 81 MG/1
81 TABLET ORAL DAILY
Qty: 30 TABLET | Refills: 3 | Status: SHIPPED | OUTPATIENT
Start: 2024-02-03

## 2024-02-02 RX ORDER — PAROXETINE HYDROCHLORIDE 40 MG/1
40 TABLET, FILM COATED ORAL DAILY
Qty: 30 TABLET | Refills: 3 | Status: SHIPPED | OUTPATIENT
Start: 2024-02-03

## 2024-02-02 RX ORDER — METOPROLOL SUCCINATE 25 MG/1
25 TABLET, EXTENDED RELEASE ORAL DAILY
Qty: 30 TABLET | Refills: 3 | Status: SHIPPED | OUTPATIENT
Start: 2024-02-03

## 2024-02-02 RX ORDER — ATORVASTATIN CALCIUM 40 MG/1
40 TABLET, FILM COATED ORAL DAILY
Qty: 30 TABLET | Refills: 3 | Status: SHIPPED | OUTPATIENT
Start: 2024-02-03

## 2024-02-02 RX ORDER — FAMOTIDINE 20 MG/1
20 TABLET, FILM COATED ORAL DAILY
Qty: 60 TABLET | Refills: 3 | Status: SHIPPED | OUTPATIENT
Start: 2024-02-03

## 2024-02-02 RX ADMIN — POTASSIUM BICARBONATE 50 MEQ: 978 TABLET, EFFERVESCENT ORAL at 10:49

## 2024-02-02 RX ADMIN — TICAGRELOR 90 MG: 90 TABLET ORAL at 08:44

## 2024-02-02 RX ADMIN — ARIPIPRAZOLE 5 MG: 5 TABLET ORAL at 08:44

## 2024-02-02 RX ADMIN — POTASSIUM CHLORIDE 40 MEQ: 1500 TABLET, EXTENDED RELEASE ORAL at 07:53

## 2024-02-02 RX ADMIN — METOPROLOL SUCCINATE 25 MG: 25 TABLET, FILM COATED, EXTENDED RELEASE ORAL at 08:44

## 2024-02-02 RX ADMIN — TAMSULOSIN HYDROCHLORIDE 0.8 MG: 0.4 CAPSULE ORAL at 08:43

## 2024-02-02 RX ADMIN — GABAPENTIN 600 MG: 300 CAPSULE ORAL at 08:44

## 2024-02-02 RX ADMIN — FAMOTIDINE 20 MG: 20 TABLET ORAL at 08:44

## 2024-02-02 RX ADMIN — ACETAMINOPHEN 325MG 650 MG: 325 TABLET ORAL at 10:49

## 2024-02-02 RX ADMIN — ASPIRIN 81 MG: 81 TABLET, COATED ORAL at 08:44

## 2024-02-02 RX ADMIN — SODIUM CHLORIDE, PRESERVATIVE FREE 10 ML: 5 INJECTION INTRAVENOUS at 08:45

## 2024-02-02 RX ADMIN — AMLODIPINE BESYLATE 5 MG: 5 TABLET ORAL at 08:44

## 2024-02-02 RX ADMIN — ATORVASTATIN CALCIUM 40 MG: 40 TABLET, FILM COATED ORAL at 08:43

## 2024-02-02 ASSESSMENT — PAIN DESCRIPTION - LOCATION
LOCATION: GENERALIZED
LOCATION: GENERALIZED
LOCATION: BACK
LOCATION: GENERALIZED;OTHER (COMMENT)
LOCATION: GENERALIZED

## 2024-02-02 ASSESSMENT — PAIN DESCRIPTION - DESCRIPTORS
DESCRIPTORS: ACHING

## 2024-02-02 ASSESSMENT — PAIN DESCRIPTION - PAIN TYPE
TYPE: ACUTE PAIN

## 2024-02-02 ASSESSMENT — PAIN DESCRIPTION - FREQUENCY
FREQUENCY: INTERMITTENT
FREQUENCY: CONTINUOUS
FREQUENCY: INTERMITTENT

## 2024-02-02 ASSESSMENT — PAIN DESCRIPTION - ORIENTATION
ORIENTATION: OTHER (COMMENT)
ORIENTATION: LOWER
ORIENTATION: OTHER (COMMENT)

## 2024-02-02 ASSESSMENT — PAIN DESCRIPTION - ONSET
ONSET: ON-GOING

## 2024-02-02 ASSESSMENT — PAIN SCALES - GENERAL
PAINLEVEL_OUTOF10: 8
PAINLEVEL_OUTOF10: 0

## 2024-02-02 ASSESSMENT — PAIN - FUNCTIONAL ASSESSMENT
PAIN_FUNCTIONAL_ASSESSMENT: ACTIVITIES ARE NOT PREVENTED

## 2024-02-02 ASSESSMENT — PAIN DESCRIPTION - DIRECTION
RADIATING_TOWARDS: NO

## 2024-02-02 NOTE — CARE COORDINATION
02/02/24 1218   IMM Letter   IMM Letter given to Patient/Family/Significant other/Guardian/POA/by: Hamida Hanna   IMM Letter date given: 02/02/24   IMM Letter time given: 1218         Medicare pt has received, reviewed, and signed 2nd IM letter informing them of their right to appeal the discharge.  Signed copy has been placed on pt bedside chart.          MARTINE Booth RN  Care Management

## 2024-02-02 NOTE — PLAN OF CARE
Problem: Skin/Tissue Integrity  Goal: Absence of new skin breakdown  Description: 1.  Monitor for areas of redness and/or skin breakdown  2.  Assess vascular access sites hourly  3.  Every 4-6 hours minimum:  Change oxygen saturation probe site  4.  Every 4-6 hours:  If on nasal continuous positive airway pressure, respiratory therapy assess nares and determine need for appliance change or resting period.  Outcome: Adequate for Discharge     Problem: ABCDS Injury Assessment  Goal: Absence of physical injury  Outcome: Adequate for Discharge     Problem: Safety - Adult  Goal: Free from fall injury  Outcome: Adequate for Discharge     Problem: Pain  Goal: Verbalizes/displays adequate comfort level or baseline comfort level  Outcome: Adequate for Discharge  Flowsheets (Taken 2/2/2024 0845 by Emely Craig, RN)  Verbalizes/displays adequate comfort level or baseline comfort level: Assess pain using appropriate pain scale     Problem: Chronic Conditions and Co-morbidities  Goal: Patient's chronic conditions and co-morbidity symptoms are monitored and maintained or improved  Outcome: Adequate for Discharge  Flowsheets (Taken 2/2/2024 0850 by Emely Craig, RN)  Care Plan - Patient's Chronic Conditions and Co-Morbidity Symptoms are Monitored and Maintained or Improved: Collaborate with multidisciplinary team to address chronic and comorbid conditions and prevent exacerbation or deterioration     Problem: Discharge Planning  Goal: Discharge to home or other facility with appropriate resources  Outcome: Adequate for Discharge  Flowsheets (Taken 2/2/2024 0850 by Emely Craig, RN)  Discharge to home or other facility with appropriate resources: Identify discharge learning needs (meds, wound care, etc)

## 2024-02-02 NOTE — PROGRESS NOTES
Bedside and Verbal shift change report given to JOSE A Snider (oncoming nurse) by JOSE A Car (offgoing nurse). Report included the following information Nurse Handoff Report, Recent Results, and Cardiac Rhythm Sinus Rhythm .

## 2024-02-02 NOTE — PROGRESS NOTES
responded to Rapid Response for  Salvador Dumont, who is a 78 y.o.,male,     The  provided the following Interventions:  Provided crisis spiritual care and support after RRT call. Patient who earlier had chest pains was seen alternately awake and drowsy, unable to hold conversation, and a little confused.  Offered assurance of prayers on behalf for the patient.   Chart reviewed.    The following outcomes were achieved:      Assessment:  There are no further spiritual or Mormon issues which require intervention at this time.     Plan:  Chaplains will continue to follow and will provide spiritual care as needed.   recommends bedside caregivers page  on duty if patient or family shows signs of acute spiritual or emotional distress.      Maryse Floyd, [facility]  Spiritual Care   [phone]

## 2024-02-02 NOTE — CARE COORDINATION
02/02/24 1221   Service Assessment   Patient Orientation Alert and Oriented   Cognition Alert   History Provided By Patient   Primary Caregiver Self   Support Systems Spouse/Significant Other;[place]anda[place];Friends/Neighbors   Patient's Healthcare Decision Maker is: Legal Next of Kin   PCP Verified by CM Yes   Last Visit to PCP Within last 3 months   Prior Functional Level Independent in ADLs/IADLs   Current Functional Level Independent in ADLs/IADLs   Can patient return to prior living arrangement Yes   Ability to make needs known: Good   Family able to assist with home care needs: Yes   Financial Resources Medicare   Social/Functional History   Lives With Spouse   Type of Home House   Home Layout Multi-level  (lives on the 2nd floor)   Home Access Stairs to enter with rails   Entrance Stairs - Number of Steps 7   Bathroom Shower/Tub Walk-in shower   Bathroom Toilet Standard   Bathroom Equipment Commode   Home Equipment Cane;Walker, rolling;Wheelchair-manual  (pt does not use equipments)   Receives Help From Friend(s)   ADL Assistance Independent   Ambulation Assistance Independent   Transfer Assistance Independent   Active  Yes   Mode of Transportation Car   Occupation Retired   Discharge Planning   Type of Residence House   Living Arrangements Spouse/Significant Other   Current Services Prior To Admission None   Potential Assistance Needed N/A   DME Ordered? No   Type of Home Care Services None   Patient expects to be discharged to: House   Services At/After Discharge   Transition of Care Consult (CM Consult) N/A  (per pt, he does not need home health)   Services At/After Discharge Outpatient   Mode of Transport at Discharge Other (see comment)  (friend/neighbour)   Confirm Follow Up Transport Self   Condition of Participation: Discharge Planning   The Plan for Transition of Care is related to the following treatment goals: Pt is discharging home.  He declined home health but in agreement to

## 2024-02-02 NOTE — DISCHARGE INSTR - DIET

## 2024-02-02 NOTE — PROGRESS NOTES
Bedside and Verbal shift change report given to Agatha RN (oncoming nurse) by Josep RN (offgoing nurse). Report included the following information Nurse Handoff Report, Intake/Output, MAR, Recent Results, Cardiac Rhythm NSR, and Neuro Assessment.

## 2024-02-02 NOTE — PROGRESS NOTES
[facility] - Progress Note    Admit Date: 1/31/2024  Attending Cardiologist: Dr. Real    Assessment:     Patient Active Problem List    Diagnosis Date Noted    FLOR (acute kidney injury) ([facility]) 01/31/2024    BPH (benign prostatic hyperplasia) 01/31/2024    Hypertension 01/31/2024    Hyperlipidemia 01/31/2024    Neuropathy 01/31/2024    Anxiety 01/31/2024    Depression 01/31/2024    NSTEMI (non-ST elevated myocardial infarction) ([facility]) 01/31/2024    Erectile dysfunction 03/23/2012    Chronic prostatitis 03/23/2012     - NSTEMI: Trop 321 > 1993. EKG with 1mm ST elevation in V2-V5, new change from prior EKG 9/2020. LHC 2/1/24 revealed dominant RCA has proximal 35% smooth stenosis, LAD has long proximal to mid stenosis that is 90-95%, stented using ANA. Additionally, circumflex coronary artery is patent. The first obtuse marginal branch has a long ostial 80% stenosis with normal flow. Will need staged OM intervention in 2-4 weeks.   - FLOR with CKD stage 3A: On IV fluids. Cr currently 1.5   - HTN  - HLD  - History of SVT: on PO diltiazem  - T2DM  - PVD  - Anxiety/depression  - BPH  - GERD     Primary cardiologist: None on file, initial consult Dr. Real    Plan:     Addendum: Independently seen and evaluated.  Agree with below.  From cardiac standpoint, he remains stable.  He has residual OM disease which needs to be staged intervention.  From cardiac standpoint, he can be discharged to home on his current cardiac medications including statins, beta-blockers, Brilinta, and aspirin.  I discussed plan with the patient.  All questions were answered.  He is in agreement.    - Patient stated that he became short of breath last night. This event was likely related to Brilinta as it resolved once he drank a cup of coffee. Discussed using caffeine when taking Brilinta if this occurs again.   - Continue DAPT with Brilinta and aspirin.   - On statin and BB.   - Patient is stable from cardiology standpoint for  02/01/2024 08:46 AM    HDL 44 02/01/2024 08:46 AM      BNP No results found for: \"BNP\", \"BNPPOC\"    No results found for: \"BNP\"   Liver Enzymes No results found for: \"ALT\", \"AST\", \"GGT\", \"ALKPHOS\", \"BILITOT\"   Thyroid Studies No results found for: \"T4\", \"T3RU\", \"TSH\"       Signed By: José Luis Kaba, [facility]     February 2, 2024

## 2024-02-02 NOTE — CARE COORDINATION
Discharge orders noted.  Chart reviewed.  PT recommending home health.  Spoke with pt.  He declined home health.  He is in agreement with cardiac rehab.  Informed him Cardiopulmonary Unit will call him.  His neighbor will be picking him up when discharged.            MARTINE Booth RN  Care Management

## 2024-02-03 LAB
ADV 40+41 DNA STL QL NAA+NON-PROBE: NOT DETECTED
ASTRO TYP 1-8 RNA STL QL NAA+NON-PROBE: NOT DETECTED
C CAYETANENSIS DNA STL QL NAA+NON-PROBE: NOT DETECTED
C COLI+JEJ+UPSA DNA STL QL NAA+NON-PROBE: NOT DETECTED
C DIF TOX TCDA+TCDB STL QL NAA+NON-PROBE: DETECTED
CRYPTOSP DNA STL QL NAA+NON-PROBE: NOT DETECTED
E COLI O157 DNA STL QL NAA+NON-PROBE: ABNORMAL
E HISTOLYT DNA STL QL NAA+NON-PROBE: NOT DETECTED
EAEC PAA PLAS AGGR+AATA ST NAA+NON-PRB: NOT DETECTED
EC STX1+STX2 GENES STL QL NAA+NON-PROBE: NOT DETECTED
EPEC EAE GENE STL QL NAA+NON-PROBE: NOT DETECTED
ETEC LTA+ST1A+ST1B TOX ST NAA+NON-PROBE: NOT DETECTED
G LAMBLIA DNA STL QL NAA+NON-PROBE: NOT DETECTED
NOROVIRUS GI+II RNA STL QL NAA+NON-PROBE: NOT DETECTED
P SHIGELLOIDES DNA STL QL NAA+NON-PROBE: NOT DETECTED
RVA RNA STL QL NAA+NON-PROBE: NOT DETECTED
S ENT+BONG DNA STL QL NAA+NON-PROBE: NOT DETECTED
SAPO I+II+IV+V RNA STL QL NAA+NON-PROBE: NOT DETECTED
SHIGELLA SP+EIEC IPAH ST NAA+NON-PROBE: NOT DETECTED
SPECIMEN SOURCE: ABNORMAL
V CHOL+PARA+VUL DNA STL QL NAA+NON-PROBE: NOT DETECTED
V CHOLERAE DNA STL QL NAA+NON-PROBE: NOT DETECTED
Y ENTEROCOL DNA STL QL NAA+NON-PROBE: NOT DETECTED

## 2024-02-03 NOTE — PROGRESS NOTES
Physician Progress Note      PATIENT:               MIRANDA ANN  CSN #:                  [id number]  :                       1945  ADMIT DATE:       2024 5:15 PM  DISCH DATE:        2024 2:45 PM  RESPONDING  PROVIDER #:        Edy Souza DO          QUERY TEXT:    Dear  Hospitalist    Patient admitted with NSTEMI . Pt noted to have BPH in H&P on  and is    being  treated with Flomax.?If possible, please document in progress notes and   discharge summary if you are evaluating and/or treating any of the following:    The medical record reflects the following:  Risk Factors: Age-78,Male  Clinical Indicators: H&P on  'BPH s/p suprapubic prostatectomy in \"  Treatment:Flomax 0.4mg  po  qd    Thank you,   Kayla Caballero RN    CCDS  Options provided:  -- BPH with partial/complete urinary obstruction  -- BPH with urinary retention without obstruction  -- Other - I will add my own diagnosis  -- Disagree - Not applicable / Not valid  -- Disagree - Clinically unable to determine / Unknown  -- Refer to Clinical Documentation Reviewer    PROVIDER RESPONSE TEXT:    This patient has BPH with urinary retention without obstruction.    Query created by: Tarah Caballero on 2024 11:18 AM      Electronically signed by:  Edy Souza DO 2/3/2024 8:06 AM

## 2024-02-05 NOTE — DISCHARGE SUMMARY
needed    Follow-up: with PCP, Billy Davies MD in 7-10days    Minutes spent on discharge: >30 minutes spent coordinating this discharge (review instructions/follow-up, prescriptions, preparing report for sign off)

## 2024-02-26 NOTE — PROGRESS NOTES
Salvador Dumont presents today for a post-hospital follow-up.  He was hospitalized from 1/31/24 through 2/2/24 for a NSTEMI.  He presented to the ER with complaints of non-radiating chest pain that began that morning. He took an ASA at home and was given NTG by EMS which relieved the pain.  EKG showed NSR, CTA was negative for PE.  Cardiology was consulted (Dr. Real).  He had an echo done on 2/1/24 and it showed an EF of 50-55%, regional wall motion abnormalities, normal diastolic function, moderate MR.  On 2/2/24, he underwent cardiac catheterization which demonstrated:   LVEDP 8 mmHg.  LV gram not done to conserve contrast.    Right dominant system.  Dominant RCA has proximal 35% smooth stenosis.    Left main is patent.    LAD has long proximal to mid stenosis that is 90-95%.  This is likely culprit.  This was successfully stented to residual 0% using 2.75 mm ANA, 38 mm length.    Circumflex coronary artery is patent.  The first obtuse marginal branch has a long ostial 80% stenosis with normal flow.    He will likely need staged OM intervention in 2-4 weeks.    He was started on DAPT.     He had a retroperitoneal ultrasound done and it showed mild bilateral increased renal echotexture suggestive of chronic medical renal disease, left renal calculi without hydronephrosis and a distended bladder. A vascular duplex scan of the right and left lower extremity showed no DVT.    He is a 78 year old male with history of hypertension, diabetes, BPH, anxiety, depression.  He was not previously followed by cardiology prior to this admission.     He was accompanied by his wife.  She states that he has not had a good appetite (previously ate unhealthy foods), has been sleeping a lot, not conversing with her, and not being interested in doing things.      Salvador Dumont is a 78 y.o. male presents today for :  Chief Complaint   Patient presents with    Follow-Up from Hospital     Hosp F/u 1/31-2/2 due to NSTEMI     Shortness

## 2024-03-04 ENCOUNTER — OFFICE VISIT (OUTPATIENT)
Age: 79
End: 2024-03-04
Payer: COMMERCIAL

## 2024-03-04 VITALS
SYSTOLIC BLOOD PRESSURE: 138 MMHG | HEART RATE: 55 BPM | DIASTOLIC BLOOD PRESSURE: 82 MMHG | BODY MASS INDEX: 26.96 KG/M2 | WEIGHT: 182 LBS | OXYGEN SATURATION: 97 % | HEIGHT: 69 IN

## 2024-03-04 DIAGNOSIS — F32.A DEPRESSION, UNSPECIFIED DEPRESSION TYPE: ICD-10-CM

## 2024-03-04 DIAGNOSIS — F41.9 ANXIETY: ICD-10-CM

## 2024-03-04 DIAGNOSIS — E78.5 HYPERLIPIDEMIA, UNSPECIFIED HYPERLIPIDEMIA TYPE: ICD-10-CM

## 2024-03-04 DIAGNOSIS — I21.4 NSTEMI (NON-ST ELEVATED MYOCARDIAL INFARCTION) (HCC): Primary | ICD-10-CM

## 2024-03-04 DIAGNOSIS — I10 ESSENTIAL (PRIMARY) HYPERTENSION: ICD-10-CM

## 2024-03-04 PROCEDURE — 3075F SYST BP GE 130 - 139MM HG: CPT | Performed by: NURSE PRACTITIONER

## 2024-03-04 PROCEDURE — 1123F ACP DISCUSS/DSCN MKR DOCD: CPT | Performed by: NURSE PRACTITIONER

## 2024-03-04 PROCEDURE — 99214 OFFICE O/P EST MOD 30 MIN: CPT | Performed by: NURSE PRACTITIONER

## 2024-03-04 PROCEDURE — 3079F DIAST BP 80-89 MM HG: CPT | Performed by: NURSE PRACTITIONER

## 2024-03-04 PROCEDURE — 93000 ELECTROCARDIOGRAM COMPLETE: CPT | Performed by: NURSE PRACTITIONER

## 2024-03-04 ASSESSMENT — PATIENT HEALTH QUESTIONNAIRE - PHQ9
4. FEELING TIRED OR HAVING LITTLE ENERGY: 0
5. POOR APPETITE OR OVEREATING: 0
SUM OF ALL RESPONSES TO PHQ QUESTIONS 1-9: 0
3. TROUBLE FALLING OR STAYING ASLEEP: 0
9. THOUGHTS THAT YOU WOULD BE BETTER OFF DEAD, OR OF HURTING YOURSELF: 0
SUM OF ALL RESPONSES TO PHQ QUESTIONS 1-9: 0
SUM OF ALL RESPONSES TO PHQ9 QUESTIONS 1 & 2: 0
SUM OF ALL RESPONSES TO PHQ QUESTIONS 1-9: 0
7. TROUBLE CONCENTRATING ON THINGS, SUCH AS READING THE NEWSPAPER OR WATCHING TELEVISION: 0
1. LITTLE INTEREST OR PLEASURE IN DOING THINGS: 0
SUM OF ALL RESPONSES TO PHQ QUESTIONS 1-9: 0
10. IF YOU CHECKED OFF ANY PROBLEMS, HOW DIFFICULT HAVE THESE PROBLEMS MADE IT FOR YOU TO DO YOUR WORK, TAKE CARE OF THINGS AT HOME, OR GET ALONG WITH OTHER PEOPLE: 0
8. MOVING OR SPEAKING SO SLOWLY THAT OTHER PEOPLE COULD HAVE NOTICED. OR THE OPPOSITE, BEING SO FIGETY OR RESTLESS THAT YOU HAVE BEEN MOVING AROUND A LOT MORE THAN USUAL: 0
2. FEELING DOWN, DEPRESSED OR HOPELESS: 0
6. FEELING BAD ABOUT YOURSELF - OR THAT YOU ARE A FAILURE OR HAVE LET YOURSELF OR YOUR FAMILY DOWN: 0

## 2024-03-04 ASSESSMENT — ENCOUNTER SYMPTOMS
CONSTIPATION: 0
BLOOD IN STOOL: 0
WHEEZING: 0
CHEST TIGHTNESS: 0
DIARRHEA: 0
NAUSEA: 0
COUGH: 0
ABDOMINAL DISTENTION: 0
VOMITING: 0
SHORTNESS OF BREATH: 1

## 2024-03-04 NOTE — PROGRESS NOTES
Salvador Dumont presents today for   Chief Complaint   Patient presents with    Follow-Up from Hospital     Hosp F/u 1/31-2/2 due to NSTEMI     Shortness of Breath     SOB at rest     Dizziness     Dizzy spells on/off       Salvador Dumont preferred language for health care discussion is english/other.    Is someone accompanying this pt? yes    Is the patient using any DME equipment during OV? no    Depression Screening:  Depression: Not at risk (3/4/2024)    PHQ-2     PHQ-2 Score: 0        Learning Assessment:  Who is the primary learner? Patient    What is the preferred language for health care of the primary learner? ENGLISH    How does the primary learner prefer to learn new concepts? DEMONSTRATION    Answered By patient    Relationship to Learner SELF           Pt currently taking Anticoagulant therapy? no    Pt currently taking Antiplatelet therapy ? ASA 81 mg 1x daily and Brilinta 90 mg 2x daily       Coordination of Care:  1. Have you been to the ER, urgent care clinic since your last visit? Hospitalized since your last visit? no    2. Have you seen or consulted any other health care providers outside of the [facility] since your last visit? Include any pap smears or colon screening. no

## 2024-03-04 NOTE — PATIENT INSTRUCTIONS
Staged Percutaneous Coronary Intervention (cardiac catheterization to address the 80% blockage) - Dr. Real - to be scheduled.  Our office will call you with tentative date  Follow-up to be scheduled after the procedure  Please take your Brilinta and Aspirin as prescribed without fail

## 2024-03-08 ENCOUNTER — PREP FOR PROCEDURE (OUTPATIENT)
Age: 79
End: 2024-03-08

## 2024-03-08 ENCOUNTER — TELEPHONE (OUTPATIENT)
Age: 79
End: 2024-03-08

## 2024-03-08 DIAGNOSIS — I25.10 CORONARY ARTERY DISEASE, UNSPECIFIED VESSEL OR LESION TYPE, UNSPECIFIED WHETHER ANGINA PRESENT, UNSPECIFIED WHETHER NATIVE OR TRANSPLANTED HEART: Primary | ICD-10-CM

## 2024-03-08 NOTE — TELEPHONE ENCOUNTER
----- Message from Raven Ortiz sent at 3/5/2024 10:45 AM EST -----  He is scheduled on 3/18 for Staged PCI.  Please call with instructions.  Thanks  ----- Message -----  From: Eulalia Cordova RN  Sent: 3/5/2024   8:37 AM EST  To: Raven Ortiz    Staged Percutaneous Coronary Intervention (cardiac catheterization to address the 80% blockage) - per Dr. Real -

## 2024-03-08 NOTE — TELEPHONE ENCOUNTER
Catheterization Instructions       You are scheduled to have a left heart catheterization   on March 18, 2024.  Cath holding will call 72 hours prior to procedure with the check in time.       Please go to [facility] and park in the outpatient parking lot that is located around to the back of the hospital and enter through the [facility] building.  Once you enter through the [facility] check in with the  there. The  will either give you directions or assist you in getting to the cath holding area. ([place] [phone])     You are not to eat or drink anything after midnight the night before your procedure. Small sips of water to take your medications is ok.      If you are diabetic, do not take your insulin/sugar pill the morning of the procedure.    MEDICATION INSTRUCTIONS:   Please take your morning medications with the following special instructions:           [x]          Please make sure to take your Blood pressure medication .          [x]          Take your Brilinta.     We encourage families to wait in the waiting room on the first floor while the procedure is being done.  The Doctor will come out and talk with you as soon as the procedure is over. You will need someone to drive you home after you have been discharged from the hospital.     There is the possibility that you may spend the night in the hospital, depending on the results of the procedure.  This will be determined after the procedure is done.  If angioplasty or stent is planned, you will stay at least one day.     If you or your family have any questions, please call our office Monday -Friday, 8:30 a.m.-4:30 p.m.,  at [phone], and ask to speak to one of the nurses.

## 2024-03-10 ENCOUNTER — HOSPITAL ENCOUNTER (EMERGENCY)
Facility: HOSPITAL | Age: 79
Discharge: HOME | End: 2024-03-10
Payer: COMMERCIAL

## 2024-03-10 ENCOUNTER — APPOINTMENT (OUTPATIENT)
Facility: HOSPITAL | Age: 79
End: 2024-03-10
Payer: COMMERCIAL

## 2024-03-10 VITALS
TEMPERATURE: 98.3 F | OXYGEN SATURATION: 96 % | HEART RATE: 55 BPM | BODY MASS INDEX: 27.85 KG/M2 | WEIGHT: 188 LBS | RESPIRATION RATE: 19 BRPM | SYSTOLIC BLOOD PRESSURE: 144 MMHG | DIASTOLIC BLOOD PRESSURE: 70 MMHG | HEIGHT: 69 IN

## 2024-03-10 DIAGNOSIS — R10.30 LOWER ABDOMINAL PAIN: Primary | ICD-10-CM

## 2024-03-10 DIAGNOSIS — N40.1 URINARY RETENTION DUE TO BENIGN PROSTATIC HYPERPLASIA: ICD-10-CM

## 2024-03-10 DIAGNOSIS — R33.8 URINARY RETENTION DUE TO BENIGN PROSTATIC HYPERPLASIA: ICD-10-CM

## 2024-03-10 LAB
ALBUMIN SERPL-MCNC: 3.5 G/DL (ref 3.4–5)
ALBUMIN/GLOB SERPL: 1 (ref 0.8–1.7)
ALP SERPL-CCNC: 107 U/L (ref 45–117)
ALT SERPL-CCNC: 37 U/L (ref 16–61)
ANION GAP SERPL CALC-SCNC: 7 MMOL/L (ref 3–18)
APPEARANCE UR: CLEAR
AST SERPL-CCNC: 25 U/L (ref 10–38)
BACTERIA URNS QL MICRO: ABNORMAL /HPF
BASOPHILS # BLD: 0 K/UL (ref 0–0.1)
BASOPHILS NFR BLD: 0 % (ref 0–2)
BILIRUB SERPL-MCNC: 0.4 MG/DL (ref 0.2–1)
BILIRUB UR QL: NEGATIVE
BUN SERPL-MCNC: 32 MG/DL (ref 7–18)
BUN/CREAT SERPL: 26 (ref 12–20)
CALCIUM SERPL-MCNC: 9.2 MG/DL (ref 8.5–10.1)
CHLORIDE SERPL-SCNC: 114 MMOL/L (ref 100–111)
CO2 SERPL-SCNC: 22 MMOL/L (ref 21–32)
COLOR UR: YELLOW
CREAT SERPL-MCNC: 1.22 MG/DL (ref 0.6–1.3)
DIFFERENTIAL METHOD BLD: ABNORMAL
EOSINOPHIL # BLD: 0.3 K/UL (ref 0–0.4)
EOSINOPHIL NFR BLD: 3 % (ref 0–5)
EPITH CASTS URNS QL MICRO: ABNORMAL /LPF (ref 0–5)
ERYTHROCYTE [DISTWIDTH] IN BLOOD BY AUTOMATED COUNT: 13 % (ref 11.6–14.5)
GLOBULIN SER CALC-MCNC: 3.4 G/DL (ref 2–4)
GLUCOSE SERPL-MCNC: 120 MG/DL (ref 74–99)
GLUCOSE UR STRIP.AUTO-MCNC: NEGATIVE MG/DL
HCT VFR BLD AUTO: 35.5 % (ref 36–48)
HGB BLD-MCNC: 12.1 G/DL (ref 13–16)
HGB UR QL STRIP: NEGATIVE
IMM GRANULOCYTES # BLD AUTO: 0.1 K/UL (ref 0–0.04)
IMM GRANULOCYTES NFR BLD AUTO: 1 % (ref 0–0.5)
KETONES UR QL STRIP.AUTO: ABNORMAL MG/DL
LACTATE SERPL-SCNC: 1.1 MMOL/L (ref 0.4–2)
LEUKOCYTE ESTERASE UR QL STRIP.AUTO: NEGATIVE
LIPASE SERPL-CCNC: 109 U/L (ref 13–75)
LYMPHOCYTES # BLD: 0.9 K/UL (ref 0.9–3.6)
LYMPHOCYTES NFR BLD: 10 % (ref 21–52)
MCH RBC QN AUTO: 31.8 PG (ref 24–34)
MCHC RBC AUTO-ENTMCNC: 34.1 G/DL (ref 31–37)
MCV RBC AUTO: 93.4 FL (ref 78–100)
MONOCYTES # BLD: 0.8 K/UL (ref 0.05–1.2)
MONOCYTES NFR BLD: 9 % (ref 3–10)
NEUTS SEG # BLD: 7.1 K/UL (ref 1.8–8)
NEUTS SEG NFR BLD: 77 % (ref 40–73)
NITRITE UR QL STRIP.AUTO: NEGATIVE
NRBC # BLD: 0 K/UL (ref 0–0.01)
NRBC BLD-RTO: 0 PER 100 WBC
PH UR STRIP: 5.5 (ref 5–8)
PLATELET # BLD AUTO: 255 K/UL (ref 135–420)
PMV BLD AUTO: 9 FL (ref 9.2–11.8)
POTASSIUM SERPL-SCNC: 3.6 MMOL/L (ref 3.5–5.5)
PROT SERPL-MCNC: 6.9 G/DL (ref 6.4–8.2)
PROT UR STRIP-MCNC: 30 MG/DL
RBC # BLD AUTO: 3.8 M/UL (ref 4.35–5.65)
RBC #/AREA URNS HPF: NEGATIVE /HPF (ref 0–5)
SODIUM SERPL-SCNC: 143 MMOL/L (ref 136–145)
SP GR UR REFRACTOMETRY: 1.02 (ref 1–1.03)
UROBILINOGEN UR QL STRIP.AUTO: 1 EU/DL (ref 0.2–1)
WBC # BLD AUTO: 9.2 K/UL (ref 4.6–13.2)
WBC URNS QL MICRO: ABNORMAL /HPF (ref 0–4)

## 2024-03-10 PROCEDURE — 96375 TX/PRO/DX INJ NEW DRUG ADDON: CPT

## 2024-03-10 PROCEDURE — 85025 COMPLETE CBC W/AUTO DIFF WBC: CPT

## 2024-03-10 PROCEDURE — 74177 CT ABD & PELVIS W/CONTRAST: CPT

## 2024-03-10 PROCEDURE — 83605 ASSAY OF LACTIC ACID: CPT

## 2024-03-10 PROCEDURE — 81001 URINALYSIS AUTO W/SCOPE: CPT

## 2024-03-10 PROCEDURE — 51701 INSERT BLADDER CATHETER: CPT

## 2024-03-10 PROCEDURE — 96374 THER/PROPH/DIAG INJ IV PUSH: CPT

## 2024-03-10 PROCEDURE — 99285 EMERGENCY DEPT VISIT HI MDM: CPT

## 2024-03-10 PROCEDURE — 2580000003 HC RX 258: Performed by: NURSE PRACTITIONER

## 2024-03-10 PROCEDURE — 87086 URINE CULTURE/COLONY COUNT: CPT

## 2024-03-10 PROCEDURE — 83690 ASSAY OF LIPASE: CPT

## 2024-03-10 PROCEDURE — 80053 COMPREHEN METABOLIC PANEL: CPT

## 2024-03-10 PROCEDURE — 6360000002 HC RX W HCPCS: Performed by: NURSE PRACTITIONER

## 2024-03-10 PROCEDURE — 6360000004 HC RX CONTRAST MEDICATION: Performed by: NURSE PRACTITIONER

## 2024-03-10 RX ORDER — MORPHINE SULFATE 2 MG/ML
2 INJECTION, SOLUTION INTRAMUSCULAR; INTRAVENOUS
Status: COMPLETED | OUTPATIENT
Start: 2024-03-10 | End: 2024-03-10

## 2024-03-10 RX ORDER — ONDANSETRON 2 MG/ML
4 INJECTION INTRAMUSCULAR; INTRAVENOUS ONCE
Status: COMPLETED | OUTPATIENT
Start: 2024-03-10 | End: 2024-03-10

## 2024-03-10 RX ORDER — 0.9 % SODIUM CHLORIDE 0.9 %
1000 INTRAVENOUS SOLUTION INTRAVENOUS ONCE
Status: COMPLETED | OUTPATIENT
Start: 2024-03-10 | End: 2024-03-10

## 2024-03-10 RX ADMIN — IOPAMIDOL 100 ML: 612 INJECTION, SOLUTION INTRAVENOUS at 19:52

## 2024-03-10 RX ADMIN — SODIUM CHLORIDE 1000 ML: 9 INJECTION, SOLUTION INTRAVENOUS at 20:13

## 2024-03-10 RX ADMIN — ONDANSETRON 4 MG: 2 INJECTION INTRAMUSCULAR; INTRAVENOUS at 18:36

## 2024-03-10 RX ADMIN — MORPHINE SULFATE 2 MG: 2 INJECTION, SOLUTION INTRAMUSCULAR; INTRAVENOUS at 18:36

## 2024-03-10 ASSESSMENT — PAIN SCALES - GENERAL: PAINLEVEL_OUTOF10: 7

## 2024-03-10 ASSESSMENT — PAIN - FUNCTIONAL ASSESSMENT: PAIN_FUNCTIONAL_ASSESSMENT: 0-10

## 2024-03-10 ASSESSMENT — PAIN DESCRIPTION - PAIN TYPE: TYPE: ACUTE PAIN

## 2024-03-10 ASSESSMENT — PAIN DESCRIPTION - LOCATION: LOCATION: ABDOMEN

## 2024-03-10 NOTE — ED NOTES
Assumed care of pt resting in bed. Wife at bedside. Pt is A&O x 4. Pt on monitor. NAD at this time.

## 2024-03-10 NOTE — ED TRIAGE NOTES
Patient arrived via EMS transport for lower abdominal pain with urinary retention for the past two days. Patient states that he had stents placed a few weeks ago. Patient is confused per baseline.

## 2024-03-11 ENCOUNTER — HOSPITAL ENCOUNTER (EMERGENCY)
Facility: HOSPITAL | Age: 79
Discharge: HOME | End: 2024-03-11
Attending: STUDENT IN AN ORGANIZED HEALTH CARE EDUCATION/TRAINING PROGRAM

## 2024-03-11 VITALS
HEART RATE: 64 BPM | RESPIRATION RATE: 16 BRPM | BODY MASS INDEX: 28.49 KG/M2 | DIASTOLIC BLOOD PRESSURE: 82 MMHG | SYSTOLIC BLOOD PRESSURE: 142 MMHG | TEMPERATURE: 97 F | WEIGHT: 188 LBS | OXYGEN SATURATION: 98 % | HEIGHT: 68 IN

## 2024-03-11 DIAGNOSIS — Z97.8 FOLEY CATHETER IN PLACE: Primary | ICD-10-CM

## 2024-03-11 PROCEDURE — 4500000002 HC ER NO CHARGE

## 2024-03-11 RX ORDER — SODIUM CHLORIDE 0.9 % (FLUSH) 0.9 %
5-40 SYRINGE (ML) INJECTION EVERY 12 HOURS SCHEDULED
Status: CANCELLED | OUTPATIENT
Start: 2024-03-11

## 2024-03-11 RX ORDER — SODIUM CHLORIDE 0.9 % (FLUSH) 0.9 %
5-40 SYRINGE (ML) INJECTION PRN
Status: CANCELLED | OUTPATIENT
Start: 2024-03-11

## 2024-03-11 RX ORDER — SODIUM CHLORIDE 9 MG/ML
INJECTION, SOLUTION INTRAVENOUS PRN
Status: CANCELLED | OUTPATIENT
Start: 2024-03-11

## 2024-03-11 ASSESSMENT — PAIN - FUNCTIONAL ASSESSMENT
PAIN_FUNCTIONAL_ASSESSMENT: NONE - DENIES PAIN
PAIN_FUNCTIONAL_ASSESSMENT: NONE - DENIES PAIN

## 2024-03-11 NOTE — DISCHARGE INSTRUCTIONS
Please call make an appointment with urology to have your catheter removed.  In the meantime continue to take your Flomax medication daily.    If your catheter becomes clogged and is unable to drain urine, start having significant abdominal pain or fevers then please return to the emergency department.

## 2024-03-11 NOTE — ED TRIAGE NOTES
Patient arrived with c/o \" I need my cather removed! The head nurse told me top come back today to have it removed\"

## 2024-03-11 NOTE — ED PROVIDER NOTES
[facility] EMERGENCY DEPT  EMERGENCY DEPARTMENT ENCOUNTER      Pt Name: Salvador Dumont  MRN: [id number]  Birthdate 1945  Date of evaluation: 3/11/2024  Provider: Rosmery Springer MD    CHIEF COMPLAINT       Chief Complaint   Patient presents with    CATHER REMOVAL         HISTORY OF PRESENT ILLNESS   (Location/Symptom, Timing/Onset, Context/Setting, Quality, Duration, Modifying Factors, Severity)  Note limiting factors.   Salvador Dumont is a 78 y.o. male who presents to the emergency department requesting his catheter be removed.  It was placed yesterday for urinary retention.  He has not had any issues with it is still draining appropriately has not had any syncope or discomfort.  Has been in for less than 24 hours.     Nursing Notes were reviewed.    REVIEW OF SYSTEMS    (2-9 systems for level 4, 10 or more for level 5)     Constitutional: No fever  Respiratory: No SOB  Cardio: No chest pain  GI: No blood in stool  : No hematuria  MSK: No back pain  Skin: No rashes  Neuro: No headache    Except as noted above the remainder of the review of systems was reviewed and negative.       PAST MEDICAL HISTORY     Past Medical History:   Diagnosis Date    BPH (benign prostatic hyperplasia)     Depression 01/31/2024    Diabetes (HCC)     Hypertension     Kidney disease     Kidney stone     Neuropathy     hands         SURGICAL HISTORY       Past Surgical History:   Procedure Laterality Date    ANTERIOR CRUCIATE LIGAMENT REPAIR  1997    CARDIAC PROCEDURE N/A 2/1/2024    Left heart cath performed by Keith Real MD at [facility] CARDIAC CATH LAB    CARDIAC PROCEDURE N/A 2/1/2024    Left ventriculography performed by Keith Real MD at [facility] CARDIAC CATH LAB    CARDIAC PROCEDURE N/A 2/1/2024    Coronary angiography performed by Keith Real MD at [facility] CARDIAC CATH LAB    CARDIAC PROCEDURE N/A 2/1/2024    Insert stent antonio coronary performed by Keith Real MD at [facility] CARDIAC CATH LAB    CHOLECYSTECTOMY      GASTRIC BYPASS SURGERY

## 2024-03-11 NOTE — ED NOTES
7:30 PM Assumed care of the patient at this time. Discussed with UNRULY Dee concerning patient Salvador Dumont, standard discussion of reason for visit, HPI, ROS, PE, and current results available.  Recommendation for obtaining pending CT imaging followed by bedside re-evaluation to dispo the pt.     Maite Mejía PA-C      8:43 PM CT resulted, unremarkable. Pt seen and re-evaluated at bedside. Pt presented to the ED for initial complaints of abd pain and urinary retention. Hawkins cath was placed at bedside. Discussed the results of today's visit with the pt at bedside. Will plan to d/c with recommendation he continue his flomax and and have close urology follow-up. Return precautions advised. Pt agrees. Maite Mejía PA-C     Disposition: d/c    Dictation disclaimer:  Please note that this dictation was completed with NextVR, the computer voice recognition software.  Quite often unanticipated grammatical, syntax, homophones, and other interpretive errors are inadvertently transcribed by the computer software.  Please disregard these errors.  Please excuse any errors that have escaped final proofreading.       Maite Mejía PA-C  03/10/24 0297

## 2024-03-12 LAB
BACTERIA SPEC CULT: NORMAL
SERVICE CMNT-IMP: NORMAL

## 2024-03-13 ENCOUNTER — TELEPHONE (OUTPATIENT)
Age: 79
End: 2024-03-13

## 2024-03-13 NOTE — TELEPHONE ENCOUNTER
Patient left voicemail wanting clarification on catheterization instructions. Called patient and reviewed catheterization instructions with him. Patient repeated instructions back to me and wrote everything down.

## 2024-03-14 ENCOUNTER — HOSPITAL ENCOUNTER (EMERGENCY)
Facility: HOSPITAL | Age: 79
Discharge: HOME | End: 2024-03-15
Attending: STUDENT IN AN ORGANIZED HEALTH CARE EDUCATION/TRAINING PROGRAM
Payer: COMMERCIAL

## 2024-03-14 VITALS
TEMPERATURE: 98.2 F | RESPIRATION RATE: 14 BRPM | SYSTOLIC BLOOD PRESSURE: 143 MMHG | HEART RATE: 53 BPM | DIASTOLIC BLOOD PRESSURE: 79 MMHG | OXYGEN SATURATION: 97 %

## 2024-03-14 DIAGNOSIS — N30.00 ACUTE CYSTITIS WITHOUT HEMATURIA: ICD-10-CM

## 2024-03-14 DIAGNOSIS — R33.9 URINARY RETENTION: Primary | ICD-10-CM

## 2024-03-14 LAB
ANION GAP SERPL CALC-SCNC: 6 MMOL/L (ref 3–18)
APPEARANCE UR: CLEAR
BACTERIA URNS QL MICRO: ABNORMAL /HPF
BILIRUB UR QL: NEGATIVE
BUN SERPL-MCNC: 19 MG/DL (ref 7–18)
BUN/CREAT SERPL: 16 (ref 12–20)
CALCIUM SERPL-MCNC: 9 MG/DL (ref 8.5–10.1)
CHLORIDE SERPL-SCNC: 115 MMOL/L (ref 100–111)
CO2 SERPL-SCNC: 23 MMOL/L (ref 21–32)
COLOR UR: YELLOW
CREAT SERPL-MCNC: 1.16 MG/DL (ref 0.6–1.3)
EPITH CASTS URNS QL MICRO: ABNORMAL /LPF (ref 0–5)
GLUCOSE SERPL-MCNC: 125 MG/DL (ref 74–99)
GLUCOSE UR STRIP.AUTO-MCNC: NEGATIVE MG/DL
HGB UR QL STRIP: ABNORMAL
KETONES UR QL STRIP.AUTO: ABNORMAL MG/DL
LEUKOCYTE ESTERASE UR QL STRIP.AUTO: ABNORMAL
NITRITE UR QL STRIP.AUTO: NEGATIVE
PH UR STRIP: 5.5 (ref 5–8)
POTASSIUM SERPL-SCNC: 3.4 MMOL/L (ref 3.5–5.5)
PROT UR STRIP-MCNC: 30 MG/DL
RBC #/AREA URNS HPF: ABNORMAL /HPF (ref 0–5)
SODIUM SERPL-SCNC: 144 MMOL/L (ref 136–145)
SP GR UR REFRACTOMETRY: 1.03 (ref 1–1.03)
UROBILINOGEN UR QL STRIP.AUTO: 1 EU/DL (ref 0.2–1)
WBC URNS QL MICRO: ABNORMAL /HPF (ref 0–4)

## 2024-03-14 PROCEDURE — 51702 INSERT TEMP BLADDER CATH: CPT

## 2024-03-14 PROCEDURE — 87086 URINE CULTURE/COLONY COUNT: CPT

## 2024-03-14 PROCEDURE — 80048 BASIC METABOLIC PNL TOTAL CA: CPT

## 2024-03-14 PROCEDURE — 99283 EMERGENCY DEPT VISIT LOW MDM: CPT

## 2024-03-14 PROCEDURE — 51798 US URINE CAPACITY MEASURE: CPT

## 2024-03-14 PROCEDURE — 81001 URINALYSIS AUTO W/SCOPE: CPT

## 2024-03-14 RX ORDER — CEPHALEXIN 500 MG/1
500 CAPSULE ORAL 4 TIMES DAILY
Qty: 20 CAPSULE | Refills: 0 | Status: SHIPPED | OUTPATIENT
Start: 2024-03-14 | End: 2024-03-19

## 2024-03-14 ASSESSMENT — ENCOUNTER SYMPTOMS
ABDOMINAL DISTENTION: 0
ABDOMINAL PAIN: 0
FACIAL SWELLING: 0
CHEST TIGHTNESS: 0
BACK PAIN: 0
EYE PAIN: 0
BLOOD IN STOOL: 0
SHORTNESS OF BREATH: 0
CONSTIPATION: 0
DIARRHEA: 0

## 2024-03-15 NOTE — DISCHARGE INSTRUCTIONS
You were evaluated for urinary retention and Urinary tract infection .  Based on your work-up it was deemed that she was stable for discharge.  Please  your medication of keflex which was prescribed to you.  Please follow-up with your primary care physician if you have any further concerns and go over your work-up.  If you experience any chest pain, shortness of breath, worsening abdominal pain, vomiting blood, worsening headache, seizures, or any worsening of your symptoms please return to the emergency department immediately.  If you have any pending results or any further questions please contact the emergency department at [phone].  Please ensure that she call [facility] [place] tomorrow

## 2024-03-15 NOTE — ED NOTES
Patient brought in by medics complaining of groin pain and not voiding since his vargas was removed this morning.

## 2024-03-15 NOTE — ED PROVIDER NOTES
EMERGENCY DEPARTMENT HISTORY AND PHYSICAL EXAM    6:22 PM      Date: 3/14/2024  Patient Name: Salvador Dumont    History of Presenting Illness     Chief Complaint   Patient presents with    Groin Pain       History From: Patient  HPI  78-year-old male with history of BPH, CAD, CKD who presents with urinary retention.  As per patient he had Hawkins catheter removed today.  However for the whole day he has not been able to urinate and having urinary retention.  Denies changes in meds, sick contacts, or any other changes.  No aggravating or alleviating factors.  When assessing ROS he denies any nausea     Nursing Notes were all reviewed and agreed with or any disagreements were addressed in the HPI.    PCP: Billy Davies MD    No current facility-administered medications for this encounter.     Current Outpatient Medications   Medication Sig Dispense Refill    cephALEXin (KEFLEX) 500 MG capsule Take 1 capsule by mouth 4 times daily for 5 days 20 capsule 0    oxyCODONE-acetaminophen (PERCOCET) 7.5-325 MG per tablet Take 1 tablet by mouth every 8 hours as needed.      atorvastatin (LIPITOR) 10 MG tablet Take 1 tablet by mouth daily      meloxicam (MOBIC) 7.5 MG tablet Take 1 tablet by mouth daily      aspirin 81 MG EC tablet Take 1 tablet by mouth daily 30 tablet 3    famotidine (PEPCID) 20 MG tablet Take 1 tablet by mouth daily 60 tablet 3    metoprolol succinate (TOPROL XL) 25 MG extended release tablet Take 1 tablet by mouth daily 30 tablet 3    PARoxetine (PAXIL) 40 MG tablet Take 1 tablet by mouth daily 30 tablet 3    ticagrelor (BRILINTA) 90 MG TABS tablet Take 1 tablet by mouth 2 times daily 60 tablet 2    tamsulosin (FLOMAX) 0.4 MG capsule Take 2 capsules by mouth daily      dilTIAZem (CARDIZEM CD) 180 MG extended release capsule Take by mouth daily      Wheat Dextrin (BENEFIBER) POWD Take 2 oz by mouth      amLODIPine (NORVASC) 5 MG tablet Take 1 tablet by mouth daily      ARIPiprazole (ABILIFY) 5 MG tablet Take

## 2024-03-16 LAB
BACTERIA SPEC CULT: NORMAL
CC UR VC: NORMAL
SERVICE CMNT-IMP: NORMAL

## 2024-03-26 ENCOUNTER — TELEPHONE (OUTPATIENT)
Age: 79
End: 2024-03-26

## 2024-03-26 NOTE — TELEPHONE ENCOUNTER
Catheterization Instructions         You are scheduled to have a left heart catheterization   on  April 4, 2024.  Cath holding will call 72 hours prior to procedure with the check in time.        Please go to [facility] and park in the outpatient parking lot that is located around to the back of the hospital and enter through the [facility] building.  Once you enter through the [facility] check in with the  there. The  will either give you directions or assist you in getting to the cath holding area. ([address] [id number])     You are not to eat or drink anything after midnight the night before your procedure. Small sips of water to take your medications is ok.      If you are diabetic, do not take your insulin/sugar pill the morning of the procedure.     MEDICATION INSTRUCTIONS:   Please take your morning medications with the following special instructions:            [x]          Please make sure to take your Blood pressure medication .           [x]          Take your Brilinta.      We encourage families to wait in the waiting room on the first floor while the procedure is being done.  The Doctor will come out and talk with you as soon as the procedure is over. You will need someone to drive you home after you have been discharged from the hospital.     There is the possibility that you may spend the night in the hospital, depending on the results of the procedure.  This will be determined after the procedure is done.  If angioplasty or stent is planned, you will stay at least one day.     If you or your family have any questions, please call our office Monday -Friday, 8:30 a.m.-4:30 p.m.,  at [phone], and ask to speak to one of the nurses.

## 2024-03-26 NOTE — TELEPHONE ENCOUNTER
Spoke to patient. Patient still has urinary catheter in , he will be having it removed on April 2. He will call if anything changes. Reviewed instructions again with patient and he will go on Friday to have blood work done.

## 2024-03-26 NOTE — TELEPHONE ENCOUNTER
----- Message from Raven Ortiz sent at 3/19/2024 10:50 AM EDT -----  Regarding: RE: needs PCI rescheduled  He is rescheduled for April 4.  ----- Message -----  From: Jennifer Stone, RN  Sent: 3/19/2024  10:24 AM EDT  To: Raven Ortiz; Eulalia Cordova RN  Subject: needs PCI rescheduled                            Patient canceled his PCI due to him having an infection. He left voicemail requesting to be rescheduled. Please set up a new date for him.     Jennifer Stone, JOSE A  Clinical Coordinator   [facility]

## 2024-04-01 NOTE — PROGRESS NOTES
Confirmed arrival time of 0645 for scheduled LHC on 4/4 at 0800. Reviewed pre-procedure instructions.

## 2024-04-02 ENCOUNTER — HOSPITAL ENCOUNTER (OUTPATIENT)
Facility: HOSPITAL | Age: 79
Discharge: HOME | End: 2024-04-05
Payer: COMMERCIAL

## 2024-04-02 DIAGNOSIS — I25.10 CORONARY ARTERY DISEASE, UNSPECIFIED VESSEL OR LESION TYPE, UNSPECIFIED WHETHER ANGINA PRESENT, UNSPECIFIED WHETHER NATIVE OR TRANSPLANTED HEART: ICD-10-CM

## 2024-04-02 LAB
ALBUMIN SERPL-MCNC: 3 G/DL (ref 3.4–5)
ALBUMIN/GLOB SERPL: 0.9 (ref 0.8–1.7)
ALP SERPL-CCNC: 116 U/L (ref 45–117)
ALT SERPL-CCNC: 26 U/L (ref 16–61)
ANION GAP SERPL CALC-SCNC: 6 MMOL/L (ref 3–18)
AST SERPL-CCNC: 31 U/L (ref 10–38)
BILIRUB SERPL-MCNC: 0.5 MG/DL (ref 0.2–1)
BUN SERPL-MCNC: 11 MG/DL (ref 7–18)
BUN/CREAT SERPL: 8 (ref 12–20)
CALCIUM SERPL-MCNC: 9 MG/DL (ref 8.5–10.1)
CHLORIDE SERPL-SCNC: 112 MMOL/L (ref 100–111)
CO2 SERPL-SCNC: 27 MMOL/L (ref 21–32)
CREAT SERPL-MCNC: 1.32 MG/DL (ref 0.6–1.3)
ERYTHROCYTE [DISTWIDTH] IN BLOOD BY AUTOMATED COUNT: 13.1 % (ref 11.6–14.5)
GLOBULIN SER CALC-MCNC: 3.2 G/DL (ref 2–4)
GLUCOSE SERPL-MCNC: 117 MG/DL (ref 74–99)
HCT VFR BLD AUTO: 36.5 % (ref 36–48)
HGB BLD-MCNC: 12.1 G/DL (ref 13–16)
INR PPP: 1 (ref 0.9–1.1)
MCH RBC QN AUTO: 30.2 PG (ref 24–34)
MCHC RBC AUTO-ENTMCNC: 33.2 G/DL (ref 31–37)
MCV RBC AUTO: 91 FL (ref 78–100)
NRBC # BLD: 0 K/UL (ref 0–0.01)
NRBC BLD-RTO: 0 PER 100 WBC
PLATELET # BLD AUTO: 330 K/UL (ref 135–420)
PMV BLD AUTO: 8.9 FL (ref 9.2–11.8)
POTASSIUM SERPL-SCNC: 3.2 MMOL/L (ref 3.5–5.5)
PROT SERPL-MCNC: 6.2 G/DL (ref 6.4–8.2)
PROTHROMBIN TIME: 13.2 SEC (ref 11.9–14.7)
RBC # BLD AUTO: 4.01 M/UL (ref 4.35–5.65)
SODIUM SERPL-SCNC: 145 MMOL/L (ref 136–145)
WBC # BLD AUTO: 6.8 K/UL (ref 4.6–13.2)

## 2024-04-02 PROCEDURE — 80053 COMPREHEN METABOLIC PANEL: CPT

## 2024-04-02 PROCEDURE — 36415 COLL VENOUS BLD VENIPUNCTURE: CPT

## 2024-04-02 PROCEDURE — 85027 COMPLETE CBC AUTOMATED: CPT

## 2024-04-02 PROCEDURE — 85610 PROTHROMBIN TIME: CPT

## 2024-04-04 ENCOUNTER — HOSPITAL ENCOUNTER (OUTPATIENT)
Facility: HOSPITAL | Age: 79
Setting detail: OUTPATIENT SURGERY
Discharge: HOME | End: 2024-04-04
Attending: INTERNAL MEDICINE | Admitting: INTERNAL MEDICINE
Payer: COMMERCIAL

## 2024-04-04 VITALS
SYSTOLIC BLOOD PRESSURE: 163 MMHG | BODY MASS INDEX: 26.22 KG/M2 | RESPIRATION RATE: 16 BRPM | DIASTOLIC BLOOD PRESSURE: 78 MMHG | WEIGHT: 177 LBS | HEART RATE: 56 BPM | HEIGHT: 69 IN | OXYGEN SATURATION: 98 %

## 2024-04-04 DIAGNOSIS — I25.10 CORONARY ARTERY DISEASE, UNSPECIFIED VESSEL OR LESION TYPE, UNSPECIFIED WHETHER ANGINA PRESENT, UNSPECIFIED WHETHER NATIVE OR TRANSPLANTED HEART: ICD-10-CM

## 2024-04-04 LAB
ANION GAP BLD CALC-SCNC: 10 (ref 10–20)
CA-I BLD-MCNC: 1.22 MMOL/L (ref 1.12–1.32)
CHLORIDE BLD-SCNC: 109 MMOL/L (ref 100–108)
CO2 BLD-SCNC: 24 MMOL/L (ref 19–24)
CREAT UR-MCNC: 1.1 MG/DL (ref 0.6–1.3)
ECHO BSA: 1.98 M2
GLUCOSE BLD STRIP.AUTO-MCNC: 101 MG/DL (ref 74–106)
POTASSIUM BLD-SCNC: 3 MMOL/L (ref 3.5–5.5)
SODIUM BLD-SCNC: 143 MMOL/L (ref 136–145)

## 2024-04-04 PROCEDURE — C1753 CATH, INTRAVAS ULTRASOUND: HCPCS | Performed by: INTERNAL MEDICINE

## 2024-04-04 PROCEDURE — C1725 CATH, TRANSLUMIN NON-LASER: HCPCS | Performed by: INTERNAL MEDICINE

## 2024-04-04 PROCEDURE — 92928 PRQ TCAT PLMT NTRAC ST 1 LES: CPT | Performed by: INTERNAL MEDICINE

## 2024-04-04 PROCEDURE — 2580000003 HC RX 258: Performed by: INTERNAL MEDICINE

## 2024-04-04 PROCEDURE — C9600 PERC DRUG-EL COR STENT SING: HCPCS | Performed by: INTERNAL MEDICINE

## 2024-04-04 PROCEDURE — 99152 MOD SED SAME PHYS/QHP 5/>YRS: CPT | Performed by: INTERNAL MEDICINE

## 2024-04-04 PROCEDURE — 6360000002 HC RX W HCPCS: Performed by: INTERNAL MEDICINE

## 2024-04-04 PROCEDURE — 6370000000 HC RX 637 (ALT 250 FOR IP): Performed by: INTERNAL MEDICINE

## 2024-04-04 PROCEDURE — 2709999900 HC NON-CHARGEABLE SUPPLY: Performed by: INTERNAL MEDICINE

## 2024-04-04 PROCEDURE — 76000 FLUOROSCOPY <1 HR PHYS/QHP: CPT | Performed by: INTERNAL MEDICINE

## 2024-04-04 PROCEDURE — 2500000003 HC RX 250 WO HCPCS: Performed by: INTERNAL MEDICINE

## 2024-04-04 PROCEDURE — C1894 INTRO/SHEATH, NON-LASER: HCPCS | Performed by: INTERNAL MEDICINE

## 2024-04-04 PROCEDURE — C1874 STENT, COATED/COV W/DEL SYS: HCPCS | Performed by: INTERNAL MEDICINE

## 2024-04-04 PROCEDURE — 80047 BASIC METABLC PNL IONIZED CA: CPT

## 2024-04-04 PROCEDURE — C1769 GUIDE WIRE: HCPCS | Performed by: INTERNAL MEDICINE

## 2024-04-04 PROCEDURE — C1887 CATHETER, GUIDING: HCPCS | Performed by: INTERNAL MEDICINE

## 2024-04-04 PROCEDURE — 99153 MOD SED SAME PHYS/QHP EA: CPT | Performed by: INTERNAL MEDICINE

## 2024-04-04 DEVICE — XIENCE SIERRA™ EVEROLIMUS ELUTING CORONARY STENT SYSTEM 3.25 MM X 23 MM / RAPID-EXCHANGE
Type: IMPLANTABLE DEVICE | Status: FUNCTIONAL
Brand: XIENCE SIERRA™

## 2024-04-04 RX ORDER — HEPARIN SODIUM 200 [USP'U]/100ML
INJECTION, SOLUTION INTRAVENOUS CONTINUOUS PRN
Status: COMPLETED | OUTPATIENT
Start: 2024-04-04 | End: 2024-04-04

## 2024-04-04 RX ORDER — BIVALIRUDIN 250 MG/5ML
INJECTION, POWDER, LYOPHILIZED, FOR SOLUTION INTRAVENOUS PRN
Status: DISCONTINUED | OUTPATIENT
Start: 2024-04-04 | End: 2024-04-04 | Stop reason: HOSPADM

## 2024-04-04 RX ORDER — HEPARIN SODIUM 1000 [USP'U]/ML
INJECTION, SOLUTION INTRAVENOUS; SUBCUTANEOUS PRN
Status: DISCONTINUED | OUTPATIENT
Start: 2024-04-04 | End: 2024-04-04 | Stop reason: HOSPADM

## 2024-04-04 RX ORDER — LIDOCAINE HYDROCHLORIDE 10 MG/ML
INJECTION, SOLUTION EPIDURAL; INFILTRATION; INTRACAUDAL; PERINEURAL PRN
Status: DISCONTINUED | OUTPATIENT
Start: 2024-04-04 | End: 2024-04-04 | Stop reason: HOSPADM

## 2024-04-04 RX ORDER — FENTANYL CITRATE 50 UG/ML
INJECTION, SOLUTION INTRAMUSCULAR; INTRAVENOUS PRN
Status: DISCONTINUED | OUTPATIENT
Start: 2024-04-04 | End: 2024-04-04 | Stop reason: HOSPADM

## 2024-04-04 RX ORDER — MIDAZOLAM HYDROCHLORIDE 1 MG/ML
INJECTION INTRAMUSCULAR; INTRAVENOUS PRN
Status: DISCONTINUED | OUTPATIENT
Start: 2024-04-04 | End: 2024-04-04 | Stop reason: HOSPADM

## 2024-04-04 RX ORDER — VERAPAMIL HYDROCHLORIDE 2.5 MG/ML
INJECTION, SOLUTION INTRAVENOUS PRN
Status: DISCONTINUED | OUTPATIENT
Start: 2024-04-04 | End: 2024-04-04 | Stop reason: HOSPADM

## 2024-04-04 RX ORDER — POTASSIUM CHLORIDE 20 MEQ/1
40 TABLET, EXTENDED RELEASE ORAL ONCE
Status: COMPLETED | OUTPATIENT
Start: 2024-04-04 | End: 2024-04-04

## 2024-04-04 RX ADMIN — POTASSIUM CHLORIDE 40 MEQ: 1500 TABLET, EXTENDED RELEASE ORAL at 08:07

## 2024-04-04 NOTE — PROGRESS NOTES
Cath holding summary     Patient escorted to cath holding from waiting area ambulatory, alert and oriented x 4, voicing no complaints.  Changed into gown and placed on monitor.   NPO since MN.  Lab results, med rec and H&P reviewed on chart.  PIV x 2 inserted without difficulty. Family at bedside.     0816  TRANSFER - OUT REPORT:  Verbal report given to Santhosh (name) on [facility]  being transferred to Cath Lab (unit) for ordered procedure  Report consisted of patient’s Situation, Background, Assessment and   Recommendations(SBAR).   Information from the following report(s) SBAR, Intake/Output, MAR, and Pre Procedure Checklist was reviewed with the receiving nurse.  Lines:    Opportunity for questions and clarification was provided.    Patient transported with:  Registered Nurse    0930  TRANSFER - IN REPORT:  Verbal report received from Yonis(name) on [facility]  being received from Cath Lab (unit) for ordered procedure   Report consisted of patient’s Situation, Background, Assessment and   Recommendations(SBAR).   Information from the following report(s) SBAR, Procedure Summary, Intake/Output, and MAR was reviewed with the receiving nurse.  Opportunity for questions and clarification was provided.    Assessment completed upon patient’s arrival to unit and care assumed.     1 stent placed in the obtuse marginal branch. Tolerated well. TR band to right radial. CDI. Denies pain. A/Ox4.    1130  TR Band removed per protocol. Quikclot and tegaderm applied. Dressing clean, dry and intact. Patient educated on signs and symptoms of bleeding. Patient verbalized understanding. No complaints at this time.     1500  AVS Discharge instructions reviewed with patient and copy given to patient.  All questions answered.  Patient verbalized understanding to all discharge instructions.  PIV removed. Procedural site within normal limits.  No hematoma or bleeding noted from procedural and PIV site. No pain noted at discharge.

## 2024-04-04 NOTE — PROGRESS NOTES
Pre-Discharge Checklist for Same Day Discharge Post PCI      Confirm that loading dose of P2Y12i has been administered.  YES    Confirm the patient has received prescriptions for at least 30 days of P2Y12i.  Patient currently taking brilinta from recent previous stent placement - educated to continue taking as prescribed    Confirm prescription for aspirin and statin.  YES    Confirm referral to cardiac rehab.  YES    Charge nurse plans on calling patient the day after discharge.  YES    The cath holding nurse has provided education to patient on how to monitor access site, and the importance of taking DAPT as prescribed and the specific risks of premature discontinuation.  YES    The cath holding nurse has provided the patient with an emergency number to call.  YES    The cath holding nurse has scheduled a follow up appointment.  YES - April 23, 2024

## 2024-04-04 NOTE — H&P
Physical:  Vitals:  /82 (Site: Left Upper Arm, Position: Sitting, Cuff Size: Small Adult)   Pulse 55   Ht 1.753 m (5' 9\")   Wt 82.6 kg (182 lb)   SpO2 97%   BMI 26.88 kg/m²      His weight is down 9 pounds from the last documented weight in the hospital.     Exam:  Physical Exam  Constitutional:       Appearance: Normal appearance.   HENT:      Head: Normocephalic and atraumatic.   Neck:      Vascular: No carotid bruit.   Cardiovascular:      Rate and Rhythm: Normal rate and regular rhythm.      Heart sounds: Normal heart sounds. No murmur heard.  Pulmonary:      Effort: Pulmonary effort is normal.      Breath sounds: Normal breath sounds.   Abdominal:      General: Bowel sounds are normal.      Palpations: Abdomen is soft.   Musculoskeletal:      Cervical back: Normal range of motion and neck supple.      Right lower leg: No edema.      Left lower leg: No edema.   Skin:     General: Skin is warm and dry.   Neurological:      General: No focal deficit present.      Mental Status: He is alert and oriented to person, place, and time.   Psychiatric:         Mood and Affect: Mood normal.         Behavior: Behavior normal.         Thought Content: Thought content normal.            Data:  EKG:     LABS:        Lab Results   Component Value Date/Time      02/02/2024 05:03 AM     K 3.1 02/02/2024 05:03 AM      02/02/2024 05:03 AM     CO2 17 02/02/2024 05:03 AM     BUN 13 02/02/2024 05:03 AM            Lab Results   Component Value Date/Time     CHOL 117 02/01/2024 08:46 AM     HDL 44 02/01/2024 08:46 AM      No results found for: \"ALT\"     Impression/Plan:  CAD, s/p NSTEMI and PCI/stent to a 90-95% LAD lesion, has 80% in OM1 of the circumflex, will plan for staged procedure (as per Dr Real's cath note)  Hypertension, blood pressure controlled  Diabetes mellitus, recommend Hgb A1c less than 7% from cardiac standpoint  Anxiety  Depression        Salvador Dumont was seen today for a post-hospital  follow-up.  He offers no cardiac complaints except for some mild, intermittent shortness of breath (likely from Brilinta). He states that it is tolerable.  He denies chest pain.  He reports that he is taking his ASA and Brilinta.  I asked that he take his medications as prescribed without fail.  Cath findings discussed at length with them.  Advised of the recommendation for staged PCI with regards to the 80% OM1 lesion.  They are in agreement with the recommendation.  Will make arrangements for the procedure.  He will be called with a tentative date and pre-procedure instructions will be reviewed with him after the procedure is scheduled.  Their questions were answered.      He has a flat affect.  His wife is concerned that he has been quiet at home, sleeps for about 12-14 hours a day, has not had an appetite, has lost interest in doing things, and does not converse with her very much.  He states that he saw his PCP and that labs were done (did not know the date of the visit).  He has history of depression.  He \"thinks\" he is taking his Paxil and Abilify.  I asked that they verify his medications.  I encouraged him to follow-up with his PCP with regards to depression.       After the staged PCI, I feel that he would benefit from cardiac rehab and will refer him after the staged PCI.      Time:  30 minutes     He will follow-up with Dr. Real as scheduled and as needed.

## 2024-04-04 NOTE — DISCHARGE INSTRUCTIONS
instructions. If stomach is upset, try clear liquids and bland, low-fat foods like rice or toast.   If the doctor gave you a prescription for pain medication, take it as prescribed.   If you are not on any prescribed pain medications, ask your doctor if you can take an over-the-counter pain medications.    General Nurse Instructions:  Follow up care is a key part of treatment and safety. Be sure to make and go to all appointments, and call your doctor if you are having any problems. It's also a good idea to know your test results and keep a list of your medicines you take.  Call 911 if you experience any of the following symptoms:  You passed out  You have severe trouble breathing  You have sudden chest pain and shortness of breath or you cough up blood.  You have symptoms of a heart attack:   Chest pain, pressure, or a strange feeling in the chest  Sweating  Shortness of breath  Nausea or vomiting  Pain, pressure or a strange feeling in the back, neck, jaw, or upper belly or in one or both shoulders or arms  Lightheadedness or sudden weakness  A fast or irregular heart rate.   You have been diagnosed with angina or coronary artery disease, and you have symptoms that do not go away with rest or are not getting better within 5 minutes of nitroglycerin.   After you call 911, the  may tell you to chew 1 adult strength or 2-4 low dose aspirin. Wait for the ambulance do not drive yourself.   Bleeding from the area where the catheter was placed in your artery or vein  You have a fast-growing painful lump at the catheter site  General Instructions for a healthy lifestyle:  No smoking or tobacco products. Avoid any exposure to second hand smoke  Surgeon General's Warning: Quitting smoking now greatly reduces serious risk to your health  Obesity, smoking and sedentary lifestyle greatly increases your risk for illness      The discharge information has been reviewed with the Patient.  The Patient verbalized

## 2024-04-09 NOTE — FLOWSHEET NOTE
No concerns/complications at this time. Patient is taking all medications as prescribed. Will follow up with Dr. Real as scheduled on 4/23.

## 2024-04-23 ENCOUNTER — OFFICE VISIT (OUTPATIENT)
Age: 79
End: 2024-04-23
Payer: COMMERCIAL

## 2024-04-23 VITALS
HEIGHT: 69 IN | WEIGHT: 180 LBS | SYSTOLIC BLOOD PRESSURE: 126 MMHG | HEART RATE: 45 BPM | OXYGEN SATURATION: 100 % | DIASTOLIC BLOOD PRESSURE: 60 MMHG | BODY MASS INDEX: 26.66 KG/M2

## 2024-04-23 DIAGNOSIS — R06.02 SOB (SHORTNESS OF BREATH): Primary | ICD-10-CM

## 2024-04-23 DIAGNOSIS — F41.9 ANXIETY: ICD-10-CM

## 2024-04-23 DIAGNOSIS — I10 ESSENTIAL (PRIMARY) HYPERTENSION: ICD-10-CM

## 2024-04-23 DIAGNOSIS — E78.5 HYPERLIPIDEMIA, UNSPECIFIED HYPERLIPIDEMIA TYPE: ICD-10-CM

## 2024-04-23 DIAGNOSIS — F32.A DEPRESSION, UNSPECIFIED DEPRESSION TYPE: ICD-10-CM

## 2024-04-23 DIAGNOSIS — I21.4 NSTEMI (NON-ST ELEVATED MYOCARDIAL INFARCTION) (HCC): ICD-10-CM

## 2024-04-23 PROCEDURE — 1123F ACP DISCUSS/DSCN MKR DOCD: CPT | Performed by: INTERNAL MEDICINE

## 2024-04-23 PROCEDURE — 93000 ELECTROCARDIOGRAM COMPLETE: CPT | Performed by: INTERNAL MEDICINE

## 2024-04-23 PROCEDURE — 3078F DIAST BP <80 MM HG: CPT | Performed by: INTERNAL MEDICINE

## 2024-04-23 PROCEDURE — 99214 OFFICE O/P EST MOD 30 MIN: CPT | Performed by: INTERNAL MEDICINE

## 2024-04-23 PROCEDURE — 3074F SYST BP LT 130 MM HG: CPT | Performed by: INTERNAL MEDICINE

## 2024-04-23 ASSESSMENT — PATIENT HEALTH QUESTIONNAIRE - PHQ9
SUM OF ALL RESPONSES TO PHQ QUESTIONS 1-9: 0
9. THOUGHTS THAT YOU WOULD BE BETTER OFF DEAD, OR OF HURTING YOURSELF: NOT AT ALL
8. MOVING OR SPEAKING SO SLOWLY THAT OTHER PEOPLE COULD HAVE NOTICED. OR THE OPPOSITE, BEING SO FIGETY OR RESTLESS THAT YOU HAVE BEEN MOVING AROUND A LOT MORE THAN USUAL: NOT AT ALL
SUM OF ALL RESPONSES TO PHQ QUESTIONS 1-9: 0
2. FEELING DOWN, DEPRESSED OR HOPELESS: NOT AT ALL
1. LITTLE INTEREST OR PLEASURE IN DOING THINGS: NOT AT ALL
5. POOR APPETITE OR OVEREATING: NOT AT ALL
6. FEELING BAD ABOUT YOURSELF - OR THAT YOU ARE A FAILURE OR HAVE LET YOURSELF OR YOUR FAMILY DOWN: NOT AT ALL
SUM OF ALL RESPONSES TO PHQ9 QUESTIONS 1 & 2: 0
7. TROUBLE CONCENTRATING ON THINGS, SUCH AS READING THE NEWSPAPER OR WATCHING TELEVISION: NOT AT ALL
4. FEELING TIRED OR HAVING LITTLE ENERGY: NOT AT ALL
3. TROUBLE FALLING OR STAYING ASLEEP: NOT AT ALL
SUM OF ALL RESPONSES TO PHQ QUESTIONS 1-9: 0
10. IF YOU CHECKED OFF ANY PROBLEMS, HOW DIFFICULT HAVE THESE PROBLEMS MADE IT FOR YOU TO DO YOUR WORK, TAKE CARE OF THINGS AT HOME, OR GET ALONG WITH OTHER PEOPLE: NOT DIFFICULT AT ALL
SUM OF ALL RESPONSES TO PHQ QUESTIONS 1-9: 0

## 2024-04-23 ASSESSMENT — ENCOUNTER SYMPTOMS: SHORTNESS OF BREATH: 1

## 2024-04-23 NOTE — PROGRESS NOTES
Salvador Dumont presents today for   Chief Complaint   Patient presents with    Follow-up     F/u after Percutaneous coronary intervention on 4/4/24     Shortness of Breath     SOB with exertion and at rest        Salvador Dumont preferred language for health care discussion is english/other.    Is someone accompanying this pt? no    Is the patient using any DME equipment during OV? no    Depression Screening:  Depression: Not at risk (4/23/2024)    PHQ-2     PHQ-2 Score: 0        Learning Assessment:  Who is the primary learner? Patient    What is the preferred language for health care of the primary learner? ENGLISH    How does the primary learner prefer to learn new concepts? DEMONSTRATION    Answered By patient    Relationship to Learner SELF           Pt currently taking Anticoagulant therapy? no    Pt currently taking Antiplatelet therapy ? ASA 81 mg 1x daily      Coordination of Care:  1. Have you been to the ER, urgent care clinic since your last visit? Hospitalized since your last visit? Yes    2. Have you seen or consulted any other health care providers outside of the [facility] since your last visit? Include any pap smears or colon screening. no    
Day.      omeprazole (PRILOSEC) 20 MG delayed release capsule Take 1 capsule by mouth Daily      tiZANidine (ZANAFLEX) 4 MG tablet TAKE 1 TABLET BY MOUTH EVERY 8 HOURS AS NEEDED       No current facility-administered medications for this visit.       The patient has a family history of    Social History     Tobacco Use    Smoking status: Never    Smokeless tobacco: Never   Vaping Use    Vaping Use: Never used   Substance Use Topics    Alcohol use: No    Drug use: No       Lab Results   Component Value Date/Time    CHOL 117 02/01/2024 08:46 AM    HDL 44 02/01/2024 08:46 AM        BP Readings from Last 3 Encounters:   04/23/24 126/60   04/04/24 (!) 163/78   03/14/24 (!) 143/79        Pulse Readings from Last 3 Encounters:   04/23/24 (!) 45   04/04/24 56   03/14/24 53       Wt Readings from Last 3 Encounters:   04/23/24 81.6 kg (180 lb)   04/04/24 80.3 kg (177 lb)   04/01/24 78.5 kg (173 lb)         EKG: nonspecific ST and T waves changes, sinus bradycardia, unchanged from previous tracings.     Keith Real MD   4/23/2024

## 2024-04-24 ENCOUNTER — TELEPHONE (OUTPATIENT)
Facility: HOSPITAL | Age: 79
End: 2024-04-24

## 2024-04-24 NOTE — TELEPHONE ENCOUNTER
[facility] received an Outpatient consult to [facility]. Called and LVM about the program. Will try to contact again at a later time.    JYOTHI Enciso  4/24/24/ 12:31 PM

## 2024-05-14 ENCOUNTER — TELEPHONE (OUTPATIENT)
Age: 79
End: 2024-05-14

## 2024-05-14 NOTE — TELEPHONE ENCOUNTER
----- Message from Enid Bourgeois MA sent at 5/14/2024  9:34 AM EDT -----    ----- Message -----  From: Keith Real MD  Sent: 4/5/2024   8:49 AM EDT  To: Eulalia Cordova RN    Replace potassium and otherwise okay.  ----- Message -----  From: Eulalia Cordova RN  Sent: 4/3/2024   6:37 AM EDT  To: Keith Real MD    Ok for cath?

## 2024-06-03 RX ORDER — POTASSIUM CHLORIDE 20 MEQ/1
20 TABLET, EXTENDED RELEASE ORAL DAILY
Qty: 30 TABLET | Refills: 5 | Status: SHIPPED | OUTPATIENT
Start: 2024-06-03

## 2024-06-10 ENCOUNTER — TELEPHONE (OUTPATIENT)
Age: 79
End: 2024-06-10

## 2024-06-10 NOTE — TELEPHONE ENCOUNTER
----- Message from Enid Bourgeois MA sent at 6/10/2024 10:15 AM EDT -----    ----- Message -----  From: Keith Real MD  Sent: 6/5/2024  12:06 PM EDT  To: Enid Bourgeois MA    His echocardiogram shows normal LV function with pulmonary hypertension.  Looks good.  ----- Message -----  From: Enid Bourgeois MA  Sent: 6/4/2024  11:52 AM EDT  To: Keith Real MD    Per your last note \"  I would continue his current medication regimen.  I would like to repeat echocardiogram as well as exercise nuclear scan as new baseline for his continued VICENTE.  I would also like to see if he has chronotropic response to physical exertion.

## 2024-06-10 NOTE — TELEPHONE ENCOUNTER
Verbal order and read back per Keith Real MD:    His echocardiogram shows normal LV function with pulmonary hypertension.  Looks good.         I called and let him know that the results came back within normal limits.

## 2024-06-19 ENCOUNTER — HOSPITAL ENCOUNTER (OUTPATIENT)
Facility: HOSPITAL | Age: 79
Discharge: HOME | End: 2024-06-22
Payer: COMMERCIAL

## 2024-06-19 DIAGNOSIS — E87.6 HYPOKALEMIA: ICD-10-CM

## 2024-06-19 LAB
ANION GAP SERPL CALC-SCNC: 6 MMOL/L (ref 3–18)
BUN SERPL-MCNC: 19 MG/DL (ref 7–18)
BUN/CREAT SERPL: 15 (ref 12–20)
CALCIUM SERPL-MCNC: 8.7 MG/DL (ref 8.5–10.1)
CHLORIDE SERPL-SCNC: 114 MMOL/L (ref 100–111)
CO2 SERPL-SCNC: 22 MMOL/L (ref 21–32)
CREAT SERPL-MCNC: 1.3 MG/DL (ref 0.6–1.3)
GLUCOSE SERPL-MCNC: 59 MG/DL (ref 74–99)
MAGNESIUM SERPL-MCNC: 2.6 MG/DL (ref 1.6–2.6)
POTASSIUM SERPL-SCNC: 4.5 MMOL/L (ref 3.5–5.5)
SODIUM SERPL-SCNC: 142 MMOL/L (ref 136–145)

## 2024-06-19 PROCEDURE — 83735 ASSAY OF MAGNESIUM: CPT

## 2024-06-19 PROCEDURE — 36415 COLL VENOUS BLD VENIPUNCTURE: CPT

## 2024-06-19 PROCEDURE — 80048 BASIC METABOLIC PNL TOTAL CA: CPT

## 2024-07-24 ENCOUNTER — OFFICE VISIT (OUTPATIENT)
Age: 79
End: 2024-07-24
Payer: COMMERCIAL

## 2024-07-24 VITALS
OXYGEN SATURATION: 98 % | HEART RATE: 69 BPM | DIASTOLIC BLOOD PRESSURE: 60 MMHG | HEIGHT: 69 IN | BODY MASS INDEX: 24.88 KG/M2 | SYSTOLIC BLOOD PRESSURE: 98 MMHG | WEIGHT: 168 LBS

## 2024-07-24 DIAGNOSIS — I25.10 CORONARY ARTERY DISEASE, UNSPECIFIED VESSEL OR LESION TYPE, UNSPECIFIED WHETHER ANGINA PRESENT, UNSPECIFIED WHETHER NATIVE OR TRANSPLANTED HEART: ICD-10-CM

## 2024-07-24 DIAGNOSIS — R07.9 CHEST PAIN, UNSPECIFIED TYPE: ICD-10-CM

## 2024-07-24 DIAGNOSIS — E87.6 HYPOKALEMIA: ICD-10-CM

## 2024-07-24 DIAGNOSIS — I21.4 NSTEMI (NON-ST ELEVATED MYOCARDIAL INFARCTION) (HCC): ICD-10-CM

## 2024-07-24 DIAGNOSIS — R06.02 SOB (SHORTNESS OF BREATH): Primary | ICD-10-CM

## 2024-07-24 DIAGNOSIS — I10 ESSENTIAL (PRIMARY) HYPERTENSION: ICD-10-CM

## 2024-07-24 PROCEDURE — 93000 ELECTROCARDIOGRAM COMPLETE: CPT | Performed by: INTERNAL MEDICINE

## 2024-07-24 PROCEDURE — 3074F SYST BP LT 130 MM HG: CPT | Performed by: INTERNAL MEDICINE

## 2024-07-24 PROCEDURE — 1123F ACP DISCUSS/DSCN MKR DOCD: CPT | Performed by: INTERNAL MEDICINE

## 2024-07-24 PROCEDURE — 3078F DIAST BP <80 MM HG: CPT | Performed by: INTERNAL MEDICINE

## 2024-07-24 PROCEDURE — 99214 OFFICE O/P EST MOD 30 MIN: CPT | Performed by: INTERNAL MEDICINE

## 2024-07-24 RX ORDER — METOPROLOL SUCCINATE 25 MG/1
25 TABLET, EXTENDED RELEASE ORAL DAILY
Qty: 90 TABLET | Refills: 3 | Status: SHIPPED | OUTPATIENT
Start: 2024-07-24

## 2024-07-24 RX ORDER — AMLODIPINE BESYLATE 5 MG/1
5 TABLET ORAL DAILY
Qty: 90 TABLET | Refills: 3 | Status: SHIPPED | OUTPATIENT
Start: 2024-07-24 | End: 2024-07-24

## 2024-07-24 RX ORDER — ATORVASTATIN CALCIUM 10 MG/1
10 TABLET, FILM COATED ORAL DAILY
Qty: 90 TABLET | Refills: 3 | Status: SHIPPED | OUTPATIENT
Start: 2024-07-24

## 2024-07-24 NOTE — PROGRESS NOTES
Salvador ArchibaldKown presents today for   Chief Complaint   Patient presents with    Follow-up     3 months       Salvador Dumont preferred language for health care discussion is english/other.    Is someone accompanying this pt? no    Is the patient using any DME equipment during OV? no    Depression Screening:  Depression: Not at risk (4/23/2024)    PHQ-2     PHQ-2 Score: 0        Learning Assessment:  No question data found.       Pt currently taking Anticoagulant therapy? no    Pt currently taking Antiplatelet therapy ? Aspirin 81 mg daily. Brilinta 90mg twice a day      Coordination of Care:  1. Have you been to the ER, urgent care clinic since your last visit? Hospitalized since your last visit? no    2. Have you seen or consulted any other health care providers outside of the [facility] since your last visit? Include any pap smears or colon screening. no

## 2024-07-24 NOTE — PROGRESS NOTES
HISTORY OF PRESENT ILLNESS  Salvador Dumont  78 y.o. male     Chief Complaint   Patient presents with    Follow-up     3 months       ASSESSMENT and PLAN    The primary encounter diagnosis was SOB (shortness of breath). Diagnoses of Hypokalemia, NSTEMI (non-ST elevated myocardial infarction) (HCC), Essential (primary) hypertension, Coronary artery disease, unspecified vessel or lesion type, unspecified whether angina present, unspecified whether native or transplanted heart, and Chest pain, unspecified type were also pertinent to this visit.    Mr. Salvador Dumont has known history of CAD.  Back in January 2024, he presented to the the emergency room with nonradiating chest pain.  His subsequent evaluation by echocardiogram revealed EF 50-55% with anterior regional wall motion abnormality and moderate MR.  He had long proximal to mid LAD 90-95% stenosis stented to residual 0% using 2.75 mm ANA.  He had a large OM long ostial 80% which was staged for April 2024 with successful 3.25 mm ANA.  He denies tobacco use or alcohol use.  He has history hypertension as well as dyslipidemia on Lipitor.  He is nuclear scan in May 2024 showed EF 60% with normal perfusion.  His echocardiogram showed EF 55-60% with mild MR and RVSP 28 mmHg.    Medication regimen reviewed and current cardiac regimen will be continued.  All new testing since the last office visit was independently reviewed by me.    CAD:    Clinically stable.  He has not had any recurrent chest pains.  HTN:   It is well-controlled at 98/60.  However, he has been having some increased episodes of orthostasis.  I have discontinued amlodipine and continued other BP regimen.  Rhythm:    Regular sinus rhythm at 69 bpm.  CHF:    There is no evidence of decompensated CHF noted.  Obesity:     His weight today is 168 pounds.  His baseline weight was 180 pounds.  His current weight is acceptable.  Hyperlipidemia:   Target LDL <70.  Continue Lipitor 10 mg daily.     Tobacco:

## 2024-07-30 ENCOUNTER — TELEPHONE (OUTPATIENT)
Age: 79
End: 2024-07-30

## 2024-07-30 NOTE — TELEPHONE ENCOUNTER
Patient left voicemail stating that he had not yet received his brilinta from the pharmacy.   Returned patient's call today. He stated that he was able to get his medication.

## 2024-08-13 ENCOUNTER — APPOINTMENT (OUTPATIENT)
Facility: HOSPITAL | Age: 79
DRG: 101 | End: 2024-08-13
Payer: MEDICARE

## 2024-08-13 ENCOUNTER — HOSPITAL ENCOUNTER (INPATIENT)
Facility: HOSPITAL | Age: 79
LOS: 1 days | Discharge: HOSPICE | DRG: 101 | End: 2024-08-14
Attending: INTERNAL MEDICINE | Admitting: INTERNAL MEDICINE
Payer: MEDICARE

## 2024-08-13 DIAGNOSIS — N30.00 ACUTE CYSTITIS WITHOUT HEMATURIA: Primary | ICD-10-CM

## 2024-08-13 DIAGNOSIS — R41.82 ALTERED MENTAL STATUS, UNSPECIFIED ALTERED MENTAL STATUS TYPE: ICD-10-CM

## 2024-08-13 PROBLEM — E87.20 METABOLIC ACIDOSIS: Status: ACTIVE | Noted: 2024-08-13

## 2024-08-13 LAB
ALBUMIN SERPL-MCNC: 4 G/DL (ref 3.4–5)
ALBUMIN/GLOB SERPL: 1.1 (ref 0.8–1.7)
ALP SERPL-CCNC: 107 U/L (ref 45–117)
ALT SERPL-CCNC: 23 U/L (ref 16–61)
AMMONIA PLAS-SCNC: <10 UMOL/L (ref 11–32)
AMPHET UR QL SCN: NEGATIVE
ANION GAP SERPL CALC-SCNC: 10 MMOL/L (ref 3–18)
APAP SERPL-MCNC: 17 UG/ML (ref 10–30)
APPEARANCE UR: CLEAR
AST SERPL-CCNC: 21 U/L (ref 10–38)
BACTERIA URNS QL MICRO: ABNORMAL /HPF
BARBITURATES UR QL SCN: NEGATIVE
BASOPHILS # BLD: 0.1 K/UL (ref 0–0.1)
BASOPHILS NFR BLD: 0 % (ref 0–2)
BENZODIAZ UR QL: POSITIVE
BILIRUB SERPL-MCNC: 0.6 MG/DL (ref 0.2–1)
BILIRUB UR QL: NEGATIVE
BUN SERPL-MCNC: 23 MG/DL (ref 7–18)
BUN/CREAT SERPL: 16 (ref 12–20)
CALCIUM SERPL-MCNC: 9.5 MG/DL (ref 8.5–10.1)
CANNABINOIDS UR QL SCN: NEGATIVE
CHLORIDE SERPL-SCNC: 112 MMOL/L (ref 100–111)
CO2 SERPL-SCNC: 20 MMOL/L (ref 21–32)
COCAINE UR QL SCN: NEGATIVE
COLOR UR: ABNORMAL
CREAT SERPL-MCNC: 1.45 MG/DL (ref 0.6–1.3)
DIFFERENTIAL METHOD BLD: ABNORMAL
EOSINOPHIL # BLD: 0.1 K/UL (ref 0–0.4)
EOSINOPHIL NFR BLD: 1 % (ref 0–5)
EPITH CASTS URNS QL MICRO: ABNORMAL /LPF (ref 0–5)
ERYTHROCYTE [DISTWIDTH] IN BLOOD BY AUTOMATED COUNT: 15.8 % (ref 11.6–14.5)
ETHANOL SERPL-MCNC: <3 MG/DL (ref 0–3)
FLUAV RNA SPEC QL NAA+PROBE: NOT DETECTED
FLUBV RNA SPEC QL NAA+PROBE: NOT DETECTED
GLOBULIN SER CALC-MCNC: 3.5 G/DL (ref 2–4)
GLUCOSE BLD STRIP.AUTO-MCNC: 110 MG/DL (ref 70–110)
GLUCOSE SERPL-MCNC: 118 MG/DL (ref 74–99)
GLUCOSE UR STRIP.AUTO-MCNC: NEGATIVE MG/DL
HCT VFR BLD AUTO: 41.7 % (ref 36–48)
HGB BLD-MCNC: 13.8 G/DL (ref 13–16)
HGB UR QL STRIP: NEGATIVE
HYALINE CASTS URNS QL MICRO: ABNORMAL /LPF (ref 0–2)
IMM GRANULOCYTES # BLD AUTO: 0.1 K/UL (ref 0–0.04)
IMM GRANULOCYTES NFR BLD AUTO: 0 % (ref 0–0.5)
KETONES UR QL STRIP.AUTO: 15 MG/DL
LEUKOCYTE ESTERASE UR QL STRIP.AUTO: ABNORMAL
LYMPHOCYTES # BLD: 1.6 K/UL (ref 0.9–3.6)
LYMPHOCYTES NFR BLD: 14 % (ref 21–52)
Lab: ABNORMAL
MCH RBC QN AUTO: 26.7 PG (ref 24–34)
MCHC RBC AUTO-ENTMCNC: 33.1 G/DL (ref 31–37)
MCV RBC AUTO: 80.8 FL (ref 78–100)
METHADONE UR QL: NEGATIVE
MONOCYTES # BLD: 1 K/UL (ref 0.05–1.2)
MONOCYTES NFR BLD: 9 % (ref 3–10)
NEUTS SEG # BLD: 8.9 K/UL (ref 1.8–8)
NEUTS SEG NFR BLD: 76 % (ref 40–73)
NITRITE UR QL STRIP.AUTO: NEGATIVE
NRBC # BLD: 0 K/UL (ref 0–0.01)
NRBC BLD-RTO: 0 PER 100 WBC
OPIATES UR QL: POSITIVE
PCP UR QL: NEGATIVE
PH UR STRIP: 5.5 (ref 5–8)
PLATELET # BLD AUTO: 359 K/UL (ref 135–420)
PMV BLD AUTO: 9.8 FL (ref 9.2–11.8)
POTASSIUM SERPL-SCNC: 3.7 MMOL/L (ref 3.5–5.5)
PROT SERPL-MCNC: 7.5 G/DL (ref 6.4–8.2)
PROT UR STRIP-MCNC: 30 MG/DL
RBC # BLD AUTO: 5.16 M/UL (ref 4.35–5.65)
RBC #/AREA URNS HPF: ABNORMAL /HPF (ref 0–5)
SALICYLATES SERPL-MCNC: <1.7 MG/DL (ref 2.8–20)
SARS-COV-2 RNA RESP QL NAA+PROBE: NOT DETECTED
SODIUM SERPL-SCNC: 142 MMOL/L (ref 136–145)
SP GR UR REFRACTOMETRY: 1.03 (ref 1–1.03)
T4 FREE SERPL-MCNC: 1.6 NG/DL (ref 0.7–1.5)
TSH SERPL DL<=0.05 MIU/L-ACNC: 1.89 UIU/ML (ref 0.36–3.74)
UROBILINOGEN UR QL STRIP.AUTO: 1 EU/DL (ref 0.2–1)
WBC # BLD AUTO: 11.8 K/UL (ref 4.6–13.2)
WBC URNS QL MICRO: ABNORMAL /HPF (ref 0–4)

## 2024-08-13 PROCEDURE — 80053 COMPREHEN METABOLIC PANEL: CPT

## 2024-08-13 PROCEDURE — 70551 MRI BRAIN STEM W/O DYE: CPT

## 2024-08-13 PROCEDURE — 6360000002 HC RX W HCPCS: Performed by: PHYSICIAN ASSISTANT

## 2024-08-13 PROCEDURE — 87040 BLOOD CULTURE FOR BACTERIA: CPT

## 2024-08-13 PROCEDURE — 99222 1ST HOSP IP/OBS MODERATE 55: CPT | Performed by: INTERNAL MEDICINE

## 2024-08-13 PROCEDURE — 84439 ASSAY OF FREE THYROXINE: CPT

## 2024-08-13 PROCEDURE — 2580000003 HC RX 258: Performed by: PHYSICIAN ASSISTANT

## 2024-08-13 PROCEDURE — 87636 SARSCOV2 & INF A&B AMP PRB: CPT

## 2024-08-13 PROCEDURE — 80179 DRUG ASSAY SALICYLATE: CPT

## 2024-08-13 PROCEDURE — 87086 URINE CULTURE/COLONY COUNT: CPT

## 2024-08-13 PROCEDURE — 87186 SC STD MICRODIL/AGAR DIL: CPT

## 2024-08-13 PROCEDURE — 81001 URINALYSIS AUTO W/SCOPE: CPT

## 2024-08-13 PROCEDURE — 82962 GLUCOSE BLOOD TEST: CPT

## 2024-08-13 PROCEDURE — 87077 CULTURE AEROBIC IDENTIFY: CPT

## 2024-08-13 PROCEDURE — 70544 MR ANGIOGRAPHY HEAD W/O DYE: CPT

## 2024-08-13 PROCEDURE — 84443 ASSAY THYROID STIM HORMONE: CPT

## 2024-08-13 PROCEDURE — 70450 CT HEAD/BRAIN W/O DYE: CPT

## 2024-08-13 PROCEDURE — 6360000002 HC RX W HCPCS: Performed by: INTERNAL MEDICINE

## 2024-08-13 PROCEDURE — 85025 COMPLETE CBC W/AUTO DIFF WBC: CPT

## 2024-08-13 PROCEDURE — 6370000000 HC RX 637 (ALT 250 FOR IP): Performed by: INTERNAL MEDICINE

## 2024-08-13 PROCEDURE — 82077 ASSAY SPEC XCP UR&BREATH IA: CPT

## 2024-08-13 PROCEDURE — 82140 ASSAY OF AMMONIA: CPT

## 2024-08-13 PROCEDURE — 99285 EMERGENCY DEPT VISIT HI MDM: CPT

## 2024-08-13 PROCEDURE — 2580000003 HC RX 258: Performed by: INTERNAL MEDICINE

## 2024-08-13 PROCEDURE — 1100000003 HC PRIVATE W/ TELEMETRY

## 2024-08-13 PROCEDURE — 80143 DRUG ASSAY ACETAMINOPHEN: CPT

## 2024-08-13 PROCEDURE — 80307 DRUG TEST PRSMV CHEM ANLYZR: CPT

## 2024-08-13 RX ORDER — PANTOPRAZOLE SODIUM 40 MG/1
40 TABLET, DELAYED RELEASE ORAL
Status: DISCONTINUED | OUTPATIENT
Start: 2024-08-14 | End: 2024-08-14 | Stop reason: HOSPADM

## 2024-08-13 RX ORDER — ONDANSETRON 2 MG/ML
4 INJECTION INTRAMUSCULAR; INTRAVENOUS EVERY 6 HOURS PRN
Status: DISCONTINUED | OUTPATIENT
Start: 2024-08-13 | End: 2024-08-14 | Stop reason: HOSPADM

## 2024-08-13 RX ORDER — METOPROLOL SUCCINATE 25 MG/1
25 TABLET, EXTENDED RELEASE ORAL DAILY
Status: DISCONTINUED | OUTPATIENT
Start: 2024-08-13 | End: 2024-08-14 | Stop reason: HOSPADM

## 2024-08-13 RX ORDER — POTASSIUM CHLORIDE 1500 MG/1
20 TABLET, EXTENDED RELEASE ORAL DAILY
Status: DISCONTINUED | OUTPATIENT
Start: 2024-08-13 | End: 2024-08-14 | Stop reason: HOSPADM

## 2024-08-13 RX ORDER — SODIUM CHLORIDE 0.9 % (FLUSH) 0.9 %
5-40 SYRINGE (ML) INJECTION EVERY 12 HOURS SCHEDULED
Status: DISCONTINUED | OUTPATIENT
Start: 2024-08-13 | End: 2024-08-14 | Stop reason: HOSPADM

## 2024-08-13 RX ORDER — TAMSULOSIN HYDROCHLORIDE 0.4 MG/1
0.4 CAPSULE ORAL DAILY
Status: DISCONTINUED | OUTPATIENT
Start: 2024-08-13 | End: 2024-08-14 | Stop reason: HOSPADM

## 2024-08-13 RX ORDER — SODIUM CHLORIDE 0.9 % (FLUSH) 0.9 %
5-40 SYRINGE (ML) INJECTION PRN
Status: DISCONTINUED | OUTPATIENT
Start: 2024-08-13 | End: 2024-08-14 | Stop reason: HOSPADM

## 2024-08-13 RX ORDER — MAGNESIUM SULFATE IN WATER 40 MG/ML
2000 INJECTION, SOLUTION INTRAVENOUS PRN
Status: DISCONTINUED | OUTPATIENT
Start: 2024-08-13 | End: 2024-08-14 | Stop reason: HOSPADM

## 2024-08-13 RX ORDER — SODIUM BICARBONATE 650 MG/1
650 TABLET ORAL 4 TIMES DAILY
Status: DISCONTINUED | OUTPATIENT
Start: 2024-08-13 | End: 2024-08-14 | Stop reason: HOSPADM

## 2024-08-13 RX ORDER — SODIUM CHLORIDE 9 MG/ML
INJECTION, SOLUTION INTRAVENOUS PRN
Status: DISCONTINUED | OUTPATIENT
Start: 2024-08-13 | End: 2024-08-14 | Stop reason: HOSPADM

## 2024-08-13 RX ORDER — ACETAMINOPHEN 325 MG/1
650 TABLET ORAL EVERY 6 HOURS PRN
Status: DISCONTINUED | OUTPATIENT
Start: 2024-08-13 | End: 2024-08-14 | Stop reason: HOSPADM

## 2024-08-13 RX ORDER — CEFTRIAXONE 1 G/1
INJECTION, POWDER, FOR SOLUTION INTRAMUSCULAR; INTRAVENOUS
Status: COMPLETED
Start: 2024-08-13 | End: 2024-08-13

## 2024-08-13 RX ORDER — ENOXAPARIN SODIUM 100 MG/ML
40 INJECTION SUBCUTANEOUS DAILY
Status: DISCONTINUED | OUTPATIENT
Start: 2024-08-13 | End: 2024-08-14 | Stop reason: HOSPADM

## 2024-08-13 RX ORDER — POLYETHYLENE GLYCOL 3350 17 G/17G
17 POWDER, FOR SOLUTION ORAL DAILY PRN
Status: DISCONTINUED | OUTPATIENT
Start: 2024-08-13 | End: 2024-08-14 | Stop reason: HOSPADM

## 2024-08-13 RX ORDER — ASPIRIN 81 MG/1
81 TABLET ORAL DAILY
Status: DISCONTINUED | OUTPATIENT
Start: 2024-08-13 | End: 2024-08-14 | Stop reason: HOSPADM

## 2024-08-13 RX ORDER — HYDROCODONE BITARTRATE AND ACETAMINOPHEN 5; 325 MG/1; MG/1
1 TABLET ORAL EVERY 12 HOURS PRN
COMMUNITY

## 2024-08-13 RX ORDER — ARIPIPRAZOLE 5 MG/1
5 TABLET ORAL DAILY
Status: DISCONTINUED | OUTPATIENT
Start: 2024-08-13 | End: 2024-08-14 | Stop reason: HOSPADM

## 2024-08-13 RX ORDER — POTASSIUM CHLORIDE 7.45 MG/ML
10 INJECTION INTRAVENOUS PRN
Status: DISCONTINUED | OUTPATIENT
Start: 2024-08-13 | End: 2024-08-14 | Stop reason: HOSPADM

## 2024-08-13 RX ORDER — ONDANSETRON 4 MG/1
4 TABLET, ORALLY DISINTEGRATING ORAL EVERY 8 HOURS PRN
Status: DISCONTINUED | OUTPATIENT
Start: 2024-08-13 | End: 2024-08-14 | Stop reason: HOSPADM

## 2024-08-13 RX ORDER — PAROXETINE 20 MG/1
40 TABLET, FILM COATED ORAL DAILY
Status: DISCONTINUED | OUTPATIENT
Start: 2024-08-13 | End: 2024-08-14 | Stop reason: HOSPADM

## 2024-08-13 RX ORDER — ATORVASTATIN CALCIUM 10 MG/1
10 TABLET, FILM COATED ORAL DAILY
Status: DISCONTINUED | OUTPATIENT
Start: 2024-08-13 | End: 2024-08-14 | Stop reason: HOSPADM

## 2024-08-13 RX ORDER — SODIUM CHLORIDE, SODIUM LACTATE, POTASSIUM CHLORIDE, CALCIUM CHLORIDE 600; 310; 30; 20 MG/100ML; MG/100ML; MG/100ML; MG/100ML
INJECTION, SOLUTION INTRAVENOUS CONTINUOUS
Status: DISPENSED | OUTPATIENT
Start: 2024-08-13 | End: 2024-08-14

## 2024-08-13 RX ORDER — TIZANIDINE 2 MG/1
4 TABLET ORAL EVERY 8 HOURS PRN
Status: DISCONTINUED | OUTPATIENT
Start: 2024-08-13 | End: 2024-08-13

## 2024-08-13 RX ORDER — ACETAMINOPHEN 650 MG/1
650 SUPPOSITORY RECTAL EVERY 6 HOURS PRN
Status: DISCONTINUED | OUTPATIENT
Start: 2024-08-13 | End: 2024-08-14 | Stop reason: HOSPADM

## 2024-08-13 RX ADMIN — ATORVASTATIN CALCIUM 10 MG: 10 TABLET, FILM COATED ORAL at 19:35

## 2024-08-13 RX ADMIN — ARIPIPRAZOLE 5 MG: 5 TABLET ORAL at 19:34

## 2024-08-13 RX ADMIN — TICAGRELOR 90 MG: 90 TABLET ORAL at 20:18

## 2024-08-13 RX ADMIN — SODIUM CHLORIDE, PRESERVATIVE FREE 10 ML: 5 INJECTION INTRAVENOUS at 20:19

## 2024-08-13 RX ADMIN — POTASSIUM CHLORIDE 20 MEQ: 1500 TABLET, EXTENDED RELEASE ORAL at 19:35

## 2024-08-13 RX ADMIN — METOPROLOL SUCCINATE 25 MG: 25 TABLET, EXTENDED RELEASE ORAL at 19:35

## 2024-08-13 RX ADMIN — ENOXAPARIN SODIUM 40 MG: 100 INJECTION SUBCUTANEOUS at 20:18

## 2024-08-13 RX ADMIN — TAMSULOSIN HYDROCHLORIDE 0.4 MG: 0.4 CAPSULE ORAL at 19:35

## 2024-08-13 RX ADMIN — CEFTRIAXONE 1000 MG: 1 INJECTION, POWDER, FOR SOLUTION INTRAMUSCULAR; INTRAVENOUS at 14:45

## 2024-08-13 RX ADMIN — ASPIRIN 81 MG: 81 TABLET, COATED ORAL at 19:35

## 2024-08-13 RX ADMIN — WATER 1000 MG: 1 INJECTION INTRAMUSCULAR; INTRAVENOUS; SUBCUTANEOUS at 14:41

## 2024-08-13 RX ADMIN — SODIUM BICARBONATE 650 MG: 650 TABLET ORAL at 20:18

## 2024-08-13 ASSESSMENT — ENCOUNTER SYMPTOMS
ABDOMINAL PAIN: 0
RHINORRHEA: 0
COLOR CHANGE: 0
SHORTNESS OF BREATH: 0
DIARRHEA: 0

## 2024-08-13 ASSESSMENT — PAIN SCALES - GENERAL
PAINLEVEL_OUTOF10: 0
PAINLEVEL_OUTOF10: 0

## 2024-08-13 ASSESSMENT — PAIN - FUNCTIONAL ASSESSMENT: PAIN_FUNCTIONAL_ASSESSMENT: NONE - DENIES PAIN

## 2024-08-13 NOTE — ED PROVIDER NOTES
EMERGENCY DEPARTMENT HISTORY AND PHYSICAL EXAM      Date: 8/13/2024  Patient Name: Salvador Dumont    History of Presenting Illness     Chief Complaint   Patient presents with    Altered Mental Status       History (Context): Salvador Dumont is a 78 y.o. male with significant past medical history of BPH, DM, htn, kidney stone, depression presents ambulatory to the ED today.  Patient presents with wife at bedside.  She states for the past 3 days patient has not been acting normally. She states they typically play cards and he was unable to \"keep up\". She then states they went to the grocery store and he did not remember where items were.  She then states the next day they went to [employer] and he brought trash home. Denies recent head trauma or injury. Patient anticoagulated aspirin and brilinta.  Wife denies vomiting, diarrhea.  Denies history of dementia.  She states changes have not been gradual and have been acute.       PCP: Billy Davies MD    Current Facility-Administered Medications   Medication Dose Route Frequency Provider Last Rate Last Admin    cefTRIAXone (ROCEPHIN) 1 g injection     Edy Souza, DO        ARIPiprazole (ABILIFY) tablet 5 mg  5 mg Oral Daily Edy Souza, DO        aspirin EC tablet 81 mg  81 mg Oral Daily Edy Souza, DO        atorvastatin (LIPITOR) tablet 10 mg  10 mg Oral Daily Edy Souza, DO        metoprolol succinate (TOPROL XL) extended release tablet 25 mg  25 mg Oral Daily Edy Souza, DO        [START ON 8/14/2024] pantoprazole (PROTONIX) tablet 40 mg  40 mg Oral QAM AC Edy Souza, DO        PARoxetine (PAXIL) tablet 40 mg  40 mg Oral Daily Edy Souza, DO        potassium chloride (KLOR-CON M) extended release tablet 20 mEq  20 mEq Oral Daily Edy Souza,         tamsulosin (FLOMAX) capsule 0.4 mg  0.4 mg Oral Daily Edy Souza, DO        ticagrelor (BRILINTA) tablet 90 mg  90 mg Oral BID Edy Souza, DO        sodium chloride  mmol/L    Chloride 112 (H) 100 - 111 mmol/L    CO2 20 (L) 21 - 32 mmol/L    Anion Gap 10 3.0 - 18 mmol/L    Glucose 118 (H) 74 - 99 mg/dL    BUN 23 (H) 7.0 - 18 MG/DL    Creatinine 1.45 (H) 0.6 - 1.3 MG/DL    BUN/Creatinine Ratio 16 12 - 20      Est, Glom Filt Rate 50 (L) >60 ml/min/1.73m2    Calcium 9.5 8.5 - 10.1 MG/DL    Total Bilirubin 0.6 0.2 - 1.0 MG/DL    ALT 23 16 - 61 U/L    AST 21 10 - 38 U/L    Alk Phosphatase 107 45 - 117 U/L    Total Protein 7.5 6.4 - 8.2 g/dL    Albumin 4.0 3.4 - 5.0 g/dL    Globulin 3.5 2.0 - 4.0 g/dL    Albumin/Globulin Ratio 1.1 0.8 - 1.7     ETOH    Collection Time: 08/13/24 11:55 AM   Result Value Ref Range    Ethanol Lvl <3 0 - 3 MG/DL   TSH    Collection Time: 08/13/24 11:55 AM   Result Value Ref Range    TSH, 3rd Generation 1.89 0.36 - 3.74 uIU/mL   T4, Free    Collection Time: 08/13/24 11:55 AM   Result Value Ref Range    T4 Free 1.6 (H) 0.7 - 1.5 NG/DL   Salicylate    Collection Time: 08/13/24 11:55 AM   Result Value Ref Range    Salicylate Lvl <1.7 (L) 2.8 - 20.0 MG/DL   Acetaminophen Level    Collection Time: 08/13/24 11:55 AM   Result Value Ref Range    Acetaminophen Level 17 10.0 - 30.0 ug/mL   Ammonia    Collection Time: 08/13/24  1:06 PM   Result Value Ref Range    Ammonia <10 (L) 11 - 32 UMOL/L   Urinalysis    Collection Time: 08/13/24  1:06 PM   Result Value Ref Range    Color, UA DARK YELLOW      Appearance CLEAR      Specific Gravity, UA 1.027 1.005 - 1.030      pH, Urine 5.5 5.0 - 8.0      Protein, UA 30 (A) NEG mg/dL    Glucose, Ur Negative NEG mg/dL    Ketones, Urine 15 (A) NEG mg/dL    Bilirubin, Urine Negative NEG      Blood, Urine Negative NEG      Urobilinogen, Urine 1.0 0.2 - 1.0 EU/dL    Nitrite, Urine Negative NEG      Leukocyte Esterase, Urine SMALL (A) NEG     Urinalysis, Micro    Collection Time: 08/13/24  1:06 PM   Result Value Ref Range    WBC, UA 10 to 20 0 - 4 /hpf    RBC, UA 0 to 3 0 - 5 /hpf    Epithelial Cells, UA FEW 0 - 5 /lpf    BACTERIA,  Tabs tablet  Commonly known as: BRILINTA  Take 1 tablet by mouth 2 times daily              Disposition: Admitted        Dragon Disclaimer     Please note that this dictation was completed with [employer], the computer voice recognition software.  Quite often unanticipated grammatical, syntax, homophones, and other interpretive errors are inadvertently transcribed by the computer software.  Please disregard these errors.  Please excuse any errors that have escaped final proofreading.      Arabella Levy PA-C, PA  08/13/24 [id number]

## 2024-08-13 NOTE — PROGRESS NOTES
MRI screening form needs to be filled out and faxed to  [phone] BEFORE MRI can be scheduled.  If unable to obtain information from patient , [facility] needs to be contacted . If patient is claustrophobic or will needs pain meds, please have ordered in advance in order to facilitate exam.

## 2024-08-13 NOTE — PROGRESS NOTES
MRI screening form needs to be filled out and faxed to  [phone] BEFORE MRI can be scheduled.  If unable to obtain information from patient , [facility] needs to be contacted . If patient is claustrophobic or will needs pain meds, please have ordered in advance in order to facilitate exam.

## 2024-08-13 NOTE — ED TRIAGE NOTES
Pt ambulatory to triage Per pt wife pt was not acting right since Sunday, pt stated that he is confused of what's happening. Pt wife stated  that last Sunday pt went to [employer] to get something to it however brought some trash back home. Denies fall

## 2024-08-13 NOTE — H&P
History & Physical    Patient: Salvador Dumont MRN: [id number]  [facility]: [id number]    YOB: 1945  Age: 78 y.o.  Sex: male      DOA: 8/13/2024    Chief Complaint:   Chief Complaint   Patient presents with    Altered Mental Status          HPI:     Salvador Dumont is a 78 y.o.  male with a pmh of CAD with recent ANA in April 2024 on DAPT, depression, type 2 DM, HTN, Nephrolithiasis who presents for altered mental status. Patient went out last night to get soup out of the pantry, he didn't come back and his wife went to look for him. He was found kneeling near the cat food. She reports he was slightly confused about what he was doing. No other deficits have been noted per the wife. The wife notes the patient \"has been weird since friday last week\" when they were playing cards. He reportedly went to the grocery store and stated they didn't have groceries. He slept during Judaism which wasn't like him as well. Patient has reported behavior changes including yelling and being mean to his wife according to his wife. This is unlike him. This change is since the weekend. He has some abdominal pain over the weekend.  Doesn't smoke, doesn't drink, no drugs but takes oxycodone twice a day.    In the emergency room it was noted the patient had a temp of 98.2, a respiratory rate of 17, heart rate of 80, blood pressure 1 6479 the patient is 99% on room air.  Labs are notable for chloride of 112, a bicarb of 20, creatinine 1.45, glucose of 118.  A CT head showed no acute intracranial abnormality but there was an area of encephalomalacia involving the right temporal occipital lobe on CT head.  The patient was given Rocephin for UTI and admitted for further workup.    Past Medical History:   Diagnosis Date    BPH (benign prostatic hyperplasia)     Depression 01/31/2024    Diabetes (HCC)     Hypertension     Kidney disease     Kidney stone     Neuropathy     hands       Past Surgical History:   Procedure Laterality    Cardiovascular: no pnd or orthopnea, no CP  GASTROINTESTINAL: No abdominal pain, no nausea, no vomiting or diarrhea, no melena or bright red blood per rectum.   GENITOURINARY: No urinary frequency, no urgency, no hesitancy or dysuria.   MUSCULOSKELETAL: No joint or muscle pain, no back pain, no recent trauma.   DERMATOLOGIC: No rash, no itching, no lesions.   ENDOCRINE: No polyuria, no polydipsia, no heat or cold intolerance. No recent change in weight.   HEMATOLOGICAL: No anemia or easy bruising or bleeding.   NEUROLOGIC: No headache, no seizures, no numbness, no tingling or weakness.       Physical Exam:     Physical Exam:  BP (!) 158/87   Pulse 80   Temp 98.2 °F (36.8 °C)   Resp 16   Ht 1.753 m (5' 9\")   Wt 70.8 kg (156 lb)   SpO2 98%   BMI 23.04 kg/m²         Temp (24hrs), Av.2 °F (36.8 °C), Min:98.2 °F (36.8 °C), Max:98.2 °F (36.8 °C)    No intake/output data recorded.   No intake/output data recorded.    General:  Alert, cooperative, no distress, appears stated age.              Head: Normocephalic, without obvious abnormality, atraumatic.   Eyes:  Conjunctivae/corneas clear. PERRL, EOMs intact.   Nose: Nares normal. No drainage or sinus tenderness.   Neck: Supple, symmetrical, trachea midline, no adenopathy, thyroid: no enlargement, no carotid bruit and no JVD.   Lungs:   Clear to auscultation bilaterally.   Heart:  Regular rate and rhythm, S1, S2 normal.     Abdomen: Soft, non-tender. Bowel sounds normal.    Extremities: Extremities normal, atraumatic, no cyanosis or edema.   Pulses: 2+ and symmetric all extremities.   Skin:  No rashes or lesions   Neurologic: AAOx2, patient follows commands, muscle strength is 5 out of 5 in the upper and lower extremities bilaterally.  No facial droop noted.  No dysmetria on heel-to-shin or finger-to-nose testing.  No focal motor or sensory deficit.       Labs Reviewed:    CT and EKG    Procedures/imaging: see electronic medical records for all procedures/Xrays

## 2024-08-14 VITALS
OXYGEN SATURATION: 97 % | WEIGHT: 156 LBS | BODY MASS INDEX: 23.11 KG/M2 | HEIGHT: 69 IN | HEART RATE: 60 BPM | TEMPERATURE: 98.1 F | SYSTOLIC BLOOD PRESSURE: 154 MMHG | DIASTOLIC BLOOD PRESSURE: 90 MMHG | RESPIRATION RATE: 18 BRPM

## 2024-08-14 LAB
ALBUMIN SERPL-MCNC: 3.7 G/DL (ref 3.4–5)
ALBUMIN/GLOB SERPL: 1.2 (ref 0.8–1.7)
ALP SERPL-CCNC: 95 U/L (ref 45–117)
ALT SERPL-CCNC: 21 U/L (ref 16–61)
ANION GAP SERPL CALC-SCNC: 8 MMOL/L (ref 3–18)
AST SERPL-CCNC: 21 U/L (ref 10–38)
BASOPHILS # BLD: 0.1 K/UL (ref 0–0.1)
BASOPHILS NFR BLD: 1 % (ref 0–2)
BILIRUB SERPL-MCNC: 0.5 MG/DL (ref 0.2–1)
BUN SERPL-MCNC: 23 MG/DL (ref 7–18)
BUN/CREAT SERPL: 19 (ref 12–20)
CALCIUM SERPL-MCNC: 9 MG/DL (ref 8.5–10.1)
CHLORIDE SERPL-SCNC: 113 MMOL/L (ref 100–111)
CO2 SERPL-SCNC: 22 MMOL/L (ref 21–32)
CREAT SERPL-MCNC: 1.23 MG/DL (ref 0.6–1.3)
DIFFERENTIAL METHOD BLD: ABNORMAL
EKG ATRIAL RATE: 59 BPM
EKG DIAGNOSIS: NORMAL
EKG P AXIS: 54 DEGREES
EKG P-R INTERVAL: 146 MS
EKG Q-T INTERVAL: 454 MS
EKG QRS DURATION: 76 MS
EKG QTC CALCULATION (BAZETT): 449 MS
EKG R AXIS: 26 DEGREES
EKG T AXIS: 65 DEGREES
EKG VENTRICULAR RATE: 59 BPM
EOSINOPHIL # BLD: 0.1 K/UL (ref 0–0.4)
EOSINOPHIL NFR BLD: 1 % (ref 0–5)
ERYTHROCYTE [DISTWIDTH] IN BLOOD BY AUTOMATED COUNT: 15.8 % (ref 11.6–14.5)
GLOBULIN SER CALC-MCNC: 3.1 G/DL (ref 2–4)
GLUCOSE BLD STRIP.AUTO-MCNC: 98 MG/DL (ref 70–110)
GLUCOSE SERPL-MCNC: 106 MG/DL (ref 74–99)
HCT VFR BLD AUTO: 38.6 % (ref 36–48)
HGB BLD-MCNC: 12.4 G/DL (ref 13–16)
IMM GRANULOCYTES # BLD AUTO: 0.1 K/UL (ref 0–0.04)
IMM GRANULOCYTES NFR BLD AUTO: 1 % (ref 0–0.5)
LYMPHOCYTES # BLD: 1.5 K/UL (ref 0.9–3.6)
LYMPHOCYTES NFR BLD: 14 % (ref 21–52)
MCH RBC QN AUTO: 26.3 PG (ref 24–34)
MCHC RBC AUTO-ENTMCNC: 32.1 G/DL (ref 31–37)
MCV RBC AUTO: 81.8 FL (ref 78–100)
MONOCYTES # BLD: 1 K/UL (ref 0.05–1.2)
MONOCYTES NFR BLD: 10 % (ref 3–10)
NEUTS SEG # BLD: 7.6 K/UL (ref 1.8–8)
NEUTS SEG NFR BLD: 74 % (ref 40–73)
NRBC # BLD: 0 K/UL (ref 0–0.01)
NRBC BLD-RTO: 0 PER 100 WBC
PLATELET # BLD AUTO: 283 K/UL (ref 135–420)
PMV BLD AUTO: 9.6 FL (ref 9.2–11.8)
POTASSIUM SERPL-SCNC: 3.7 MMOL/L (ref 3.5–5.5)
PROT SERPL-MCNC: 6.8 G/DL (ref 6.4–8.2)
RBC # BLD AUTO: 4.72 M/UL (ref 4.35–5.65)
SODIUM SERPL-SCNC: 143 MMOL/L (ref 136–145)
WBC # BLD AUTO: 10.3 K/UL (ref 4.6–13.2)

## 2024-08-14 PROCEDURE — 36415 COLL VENOUS BLD VENIPUNCTURE: CPT

## 2024-08-14 PROCEDURE — 93005 ELECTROCARDIOGRAM TRACING: CPT | Performed by: INTERNAL MEDICINE

## 2024-08-14 PROCEDURE — 6360000002 HC RX W HCPCS: Performed by: INTERNAL MEDICINE

## 2024-08-14 PROCEDURE — 94761 N-INVAS EAR/PLS OXIMETRY MLT: CPT

## 2024-08-14 PROCEDURE — 80053 COMPREHEN METABOLIC PANEL: CPT

## 2024-08-14 PROCEDURE — 82962 GLUCOSE BLOOD TEST: CPT

## 2024-08-14 PROCEDURE — 6370000000 HC RX 637 (ALT 250 FOR IP): Performed by: INTERNAL MEDICINE

## 2024-08-14 PROCEDURE — 85025 COMPLETE CBC W/AUTO DIFF WBC: CPT

## 2024-08-14 PROCEDURE — 99221 1ST HOSP IP/OBS SF/LOW 40: CPT | Performed by: PSYCHIATRY & NEUROLOGY

## 2024-08-14 PROCEDURE — 2580000003 HC RX 258: Performed by: INTERNAL MEDICINE

## 2024-08-14 PROCEDURE — 93010 ELECTROCARDIOGRAM REPORT: CPT | Performed by: INTERNAL MEDICINE

## 2024-08-14 PROCEDURE — 99239 HOSP IP/OBS DSCHRG MGMT >30: CPT | Performed by: INTERNAL MEDICINE

## 2024-08-14 RX ADMIN — PAROXETINE HYDROCHLORIDE 40 MG: 20 TABLET, FILM COATED ORAL at 09:10

## 2024-08-14 RX ADMIN — SODIUM BICARBONATE 650 MG: 650 TABLET ORAL at 09:12

## 2024-08-14 RX ADMIN — METOPROLOL SUCCINATE 25 MG: 25 TABLET, EXTENDED RELEASE ORAL at 09:16

## 2024-08-14 RX ADMIN — PANTOPRAZOLE SODIUM 40 MG: 40 TABLET, DELAYED RELEASE ORAL at 06:51

## 2024-08-14 RX ADMIN — SODIUM CHLORIDE, PRESERVATIVE FREE 10 ML: 5 INJECTION INTRAVENOUS at 09:12

## 2024-08-14 RX ADMIN — TICAGRELOR 90 MG: 90 TABLET ORAL at 09:10

## 2024-08-14 RX ADMIN — ASPIRIN 81 MG: 81 TABLET, COATED ORAL at 09:10

## 2024-08-14 RX ADMIN — ENOXAPARIN SODIUM 40 MG: 100 INJECTION SUBCUTANEOUS at 09:11

## 2024-08-14 RX ADMIN — TAMSULOSIN HYDROCHLORIDE 0.4 MG: 0.4 CAPSULE ORAL at 09:10

## 2024-08-14 RX ADMIN — ARIPIPRAZOLE 5 MG: 5 TABLET ORAL at 09:10

## 2024-08-14 RX ADMIN — POTASSIUM CHLORIDE 20 MEQ: 1500 TABLET, EXTENDED RELEASE ORAL at 09:11

## 2024-08-14 RX ADMIN — ATORVASTATIN CALCIUM 10 MG: 10 TABLET, FILM COATED ORAL at 09:10

## 2024-08-14 ASSESSMENT — PAIN SCALES - GENERAL
PAINLEVEL_OUTOF10: 0

## 2024-08-14 NOTE — DISCHARGE SUMMARY
Discharge Summary    Patient: Salvador Dumont MRN: [id number]  CSN: [id number]    YOB: 1945  Age: 78 y.o.  Sex: male    DOA: 8/13/2024 LOS:  LOS: 1 day   Discharge Date: 8/14/2024     Admission Diagnosis: Altered mental status [R41.82]  Acute cystitis without hematuria [N30.00]  Altered mental status, unspecified altered mental status type [R41.82]  AMS (altered mental status) [R41.82]    Discharge Diagnosis:    Altered mental status  CAD status post drug-eluting stent to mid LAD in January 2024 and ostial drug-eluting stent in April 2024 on dual antiplatelet therapy  Possible UTI  BPH  Hypertension  Hyperlipidemia  History of anxiety and depression  History of nephrolithiasis  Metabolic acidosis    Discharge Condition: Stable    PHYSICAL EXAM  Visit Vitals  BP (!) 154/90   Pulse 60   Temp 98.1 °F (36.7 °C) (Oral)   Resp 18   Ht 1.753 m (5' 9\")   Wt 70.8 kg (156 lb)   SpO2 97%   BMI 23.04 kg/m²       General:  Alert, cooperative, no distress, appears stated age.              Head: Normocephalic, without obvious abnormality, atraumatic.   Eyes:  Conjunctivae/corneas clear. PERRL, EOMs intact.   Nose: Nares normal. No drainage or sinus tenderness.   Neck: Supple, symmetrical, trachea midline, no adenopathy, thyroid: no enlargement, no carotid bruit and no JVD.   Lungs:   Clear to auscultation bilaterally.   Heart:  Regular rate and rhythm, S1, S2 normal.     Abdomen: Soft, non-tender. Bowel sounds normal.    Extremities: Extremities normal, atraumatic, no cyanosis or edema.   Pulses: 2+ and symmetric all extremities.   Skin:  No rashes or lesions   Neurologic: AAOx2, patient follows commands, muscle strength is 5 out of 5 in the upper and lower extremities bilaterally.  No facial droop noted.  No dysmetria on heel-to-shin or finger-to-nose testing.  No focal motor or sensory deficit.       Hospital Course: Per the H&P:Salvador Dumont is a 78 y.o.  male with a pmh of CAD with recent ANA in  April 2024 on DAPT, depression, type 2 DM, HTN, Nephrolithiasis who presents for altered mental status. Patient went out last night to get soup out of the pantry, he didn't come back and his wife went to look for him. He was found kneeling near the cat food. She reports he was slightly confused about what he was doing. No other deficits have been noted per the wife. The wife notes the patient \"has been weird since friday last week\" when they were playing cards. He reportedly went to the grocery store and stated they didn't have groceries. He slept during Muslim which wasn't like him as well. Patient has reported behavior changes including yelling and being mean to his wife according to his wife. This is unlike him. This change is since the weekend. He has some abdominal pain over the weekend.  Doesn't smoke, doesn't drink, no drugs but takes oxycodone twice a day.     In the emergency room it was noted the patient had a temp of 98.2, a respiratory rate of 17, heart rate of 80, blood pressure 1 6479 the patient is 99% on room air.  Labs are notable for chloride of 112, a bicarb of 20, creatinine 1.45, glucose of 118.  A CT head showed no acute intracranial abnormality but there was an area of encephalomalacia involving the right temporal occipital lobe on CT head.  The patient was given Rocephin for UTI and admitted for further workup.    Patient admitted and placed on telemetry.  Telemetry reviewed overnight which showed no events.  Neurology was consulted who noted the patient likely had a old stroke but nothing acute.  The patient's confusion improved.  The possibility of nonconvulsive seizures causing confusion were raised however neurology did not recommend anticonvulsants at this time.  The patient never had any witnessed seizure-like activity.  The patient was treated for UTI and Augmentin was prescribed.  The wife was informed and follow-up was arranged with the PCP and neurology.    Consults:

## 2024-08-14 NOTE — PLAN OF CARE
Problem: Discharge Planning  Goal: Discharge to home or other facility with appropriate resources  8/14/2024 1132 by Kevin Peoples RN  Outcome: Adequate for Discharge  Flowsheets (Taken 8/14/2024 0918)  Discharge to home or other facility with appropriate resources: Identify barriers to discharge with patient and caregiver  8/14/2024 0003 by Laverne Loo RN  Outcome: Progressing     Problem: Skin/Tissue Integrity  Goal: Absence of new skin breakdown  Description: 1.  Monitor for areas of redness and/or skin breakdown  2.  Assess vascular access sites hourly  3.  Every 4-6 hours minimum:  Change oxygen saturation probe site  4.  Every 4-6 hours:  If on nasal continuous positive airway pressure, respiratory therapy assess nares and determine need for appliance change or resting period.  8/14/2024 1132 by Kevin Peoples RN  Outcome: Adequate for Discharge  8/14/2024 0003 by Laverne Loo RN  Outcome: Progressing     Problem: Safety - Adult  Goal: Free from fall injury  Outcome: Adequate for Discharge     Problem: Neurosensory - Adult  Goal: Achieves stable or improved neurological status  Outcome: Adequate for Discharge  Flowsheets  Taken 8/14/2024 0918 by Kevin Peoples RN  Achieves stable or improved neurological status: Assess for and report changes in neurological status  Taken 8/13/2024 2145 by Laverne Loo RN  Achieves stable or improved neurological status:   Assess for and report changes in neurological status   Maintain blood pressure and fluid volume within ordered parameters to optimize cerebral perfusion and minimize risk of hemorrhage     Problem: Infection - Adult  Goal: Absence of infection at discharge  Outcome: Adequate for Discharge  Flowsheets (Taken 8/14/2024 0918)  Absence of infection at discharge: Assess and monitor for signs and symptoms of infection

## 2024-08-14 NOTE — CONSULTS
Impression:  Transient confusion which appears to have cleared.  There is no evidence of an acute ischemic event.    Plan:  Continue aspirin and Plavix  He can be discharged at your discretion    HPI:  This is a 78-year-old man who has a past history of diabetes, hypertension, coronary disease who presented with acute confusion.  He apparently had difficulty playing cards and went out for food and did not return with anything.  He was brought in for emergent evaluation.  A CT of the brain did not show any acute changes.  A follow-up MRI of the brain did not show any acute ischemic change, but only chronic findings in the right temporal occipital area.  MRA was unremarkable.  Toxicology screen was positive for benzodiazepines and opiates.  COVID was negative.  CBC and serum chemistries were unremarkable.  Urine analysis showed a small amount of leukocytes and he was started on antibiotic.    PMH: As noted    Medication list was reviewed.    Family history is noncontributory.    10 system review of systems is otherwise negative.    Physical exam:  Generally: Well-nourished in no acute distress.  HEENT: Pupils equal and reactive.  Conjunctiva clear.  Oropharynx clear.  Neck: Supple without lymphadenopathy.  Cardiovascular: Regular rate and rhythm.  Lungs: Clear to auscultation  Abdomen: Soft and nontender.  Skin: No rash  Extremities: No cyanosis clubbing or edema  Neurologic exam:  Alert and oriented x3.  Speech is fluent and clear.  Follows commands appropriately.  Cranial nerves II through XII are intact.  Motor exam shows good strength bilaterally without fix or drift.  Sensation is intact to light touch bilaterally.  Cerebellar testing shows normal finger-to-nose movements.  No abnormal movements were seen.  Reflexes were symmetric and physiologic.  Plantar responses are downgoing.    Laboratory studies were reviewed.    Imaging studies were reviewed.    Summary:  This is a 78-year-old man who apparently was  behaving oddly yesterday.  His stroke workup has been unremarkable.  His toxicology screen showed medications which might of played a part.  There may be a urinary tract infection as well.  He seems to be alert and oriented at this point.  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  Given that this is the first known event, I would not start him on anticonvulsants without more definitive proof.  Recall if you have further questions.    Thank you for allow me to participate in the care of this patient.  I can be reached at [phone].     Detail Level: Simple Comment: Acne - nature/etiology discussed. S/p 2 months, increased to Isotretinoin 40mg PO QD last month, will continue. Patient reports cheilitis, denies any other side effects. Labs reviewed, WNL. FU 1 month. Render Risk Assessment In Note?: yes Render Risk Assessment In Note?: no Comment: Will begin Mycolog ointment BID x7-10 days.

## 2024-08-14 NOTE — ED NOTES
Assumed care of patient. Patient laying right side, eyes open. Blankets pulled up to shoulders. Skin is warm and dry to touch. No respiratory distress observed.

## 2024-08-14 NOTE — PROGRESS NOTES
Prepared patient for discharge.PIV removed, AVS given and instructed, pt subsequently verbalized understanding. No further complaints made. Ushered safely to his wife's car. Left in good disposition. AOX4 on room air, not in distress.

## 2024-08-14 NOTE — DISCHARGE INSTRUCTIONS
Hello you came in with confusion which has improved. Take care. No new stroke was seen on MRI but an old stroke was seen. Follow up with your PCP, consider stopping abilify. You will get Augmentin for a possible UTI.

## 2024-08-16 LAB
BACTERIA SPEC CULT: ABNORMAL
CC UR VC: ABNORMAL
SERVICE CMNT-IMP: ABNORMAL

## 2024-08-18 LAB
BACTERIA SPEC CULT: NORMAL
BACTERIA SPEC CULT: NORMAL
SERVICE CMNT-IMP: NORMAL
SERVICE CMNT-IMP: NORMAL

## 2024-08-27 NOTE — PROGRESS NOTES
Physician Progress Note      PATIENT:               MIRANDA ANN  CSN #:                  [id number]  :                       1945  ADMIT DATE:       2024 11:50 AM  DISCH DATE:        2024 1:13 PM  RESPONDING  PROVIDER #:        Edy Souza DO          QUERY TEXT:        Dear attending,    Pt noted to have altered mental status.  Per dc summary- Neurology was consulted who noted the patient likely had a old   stroke but nothing acute. The patient's confusion improved. The possibility   of nonconvulsive seizures causing confusion were raised however neurology did   not recommend anticonvulsants at this time. The patient never had any   witnessed seizure-like activity. The patient was treated for UTI and Augmentin   was prescribed.    .If possible, please document in the progress notes and discharge summary if   you are evaluating and / or treating any of the following:    The medical record reflects the following:  Risk Factors: hx. CVA    Clinical Indicators:  Per H&P- Altered mental status  CAD status post drug-eluting stent to mid LAD in 2024 and ostial   drug-eluting stent in 2024 on dual antiplatelet therapy  Possible UTI  Cause of altered mental status is possibly UTI versus a degenerative   neurological process versus dementia versus medication side effects  CT head has no evidence of bleed but shows area of encephalomalacia, query if   symptoms are related to a degenerative process  UA has some evidence of bacteria and leukocyte esterase, will cover with   Rocephin  Per dc summary- Neurology was consulted who noted the patient likely had a old   stroke but nothing acute. The patient's confusion improved. The possibility   of nonconvulsive seizures causing confusion were raised however neurology did   not recommend anticonvulsants at this time. The patient never had any   witnessed seizure-like activity. The patient was treated for UTI and Augmentin   was  prescribed.    Treatment: Monitor labwork, imaging, neuro status, IV Rocephin        Thank you  Rosmery Lewis RN, BSN, CRCR, CCDS  Clinical Documentation Improvement  Asim phone # [phone] or via [facility]  Options provided:  -- encephalopathy acute due to, please specify cause  -- Encephalopathy due to nonconvulsive seizure  -- Metabolic encephalopathy due to UTI  -- Other - I will add my own diagnosis  -- Disagree - Not applicable / Not valid  -- Disagree - Clinically unable to determine / Unknown  -- Refer to Clinical Documentation Reviewer    PROVIDER RESPONSE TEXT:    This patient has metabolic encephalopathy due to UTI.    Query created by: Rosmery Lewis on 8/21/2024 1:12 PM      QUERY TEXT:        Dear attending,    Pt admitted with AMS and has a history of stroke in right tempo-occipital   lobe. MRI showed postinfarct encephalomalacia. Per the dc summary- the   possibility of nonconvulsive seizures causing confusion were raised however   neurology did not recommend anticonvulsants at this time. The patient never   had any witnessed seizure-like activity.  After further review, can the   etiology of the seizure be clarified as:.    The medical record reflects the following:  Risk Factors: hx. CVA  Clinical Indicators: 8/14-MRI brain- IMPRESSION:  1. No acute intracranial hemorrhage or territorial infarct. No mass effect.  -Small area of postinfarct encephalomalacia at the right temporo-occipital  junction  Other minimal chronic white matter disease, also typically small vessel  ischemic sequelae    8/14-per neurology- This is a 78-year-old man who apparently was behaving   oddly yesterday.  His stroke workup has been unremarkable.  His toxicology   screen showed medications which might of played a part.  There may be a   urinary tract infection as well.  He seems to be alert and oriented at this   point.  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

## 2024-09-20 ENCOUNTER — APPOINTMENT (OUTPATIENT)
Facility: HOSPITAL | Age: 79
End: 2024-09-20
Payer: COMMERCIAL

## 2024-09-20 ENCOUNTER — HOSPITAL ENCOUNTER (EMERGENCY)
Facility: HOSPITAL | Age: 79
Discharge: HOME | End: 2024-09-20
Payer: COMMERCIAL

## 2024-09-20 VITALS
WEIGHT: 140 LBS | DIASTOLIC BLOOD PRESSURE: 72 MMHG | OXYGEN SATURATION: 99 % | HEART RATE: 64 BPM | TEMPERATURE: 98.3 F | HEIGHT: 70 IN | SYSTOLIC BLOOD PRESSURE: 129 MMHG | RESPIRATION RATE: 16 BRPM | BODY MASS INDEX: 20.04 KG/M2

## 2024-09-20 DIAGNOSIS — R53.83 OTHER FATIGUE: Primary | ICD-10-CM

## 2024-09-20 LAB
ALBUMIN SERPL-MCNC: 3.2 G/DL (ref 3.4–5)
ALBUMIN/GLOB SERPL: 1.1 (ref 0.8–1.7)
ALP SERPL-CCNC: 70 U/L (ref 45–117)
ALT SERPL-CCNC: 34 U/L (ref 16–61)
ANION GAP SERPL CALC-SCNC: 6 MMOL/L (ref 3–18)
APPEARANCE UR: CLEAR
AST SERPL-CCNC: 20 U/L (ref 10–38)
BACTERIA URNS QL MICRO: ABNORMAL /HPF
BASOPHILS # BLD: 0 K/UL (ref 0–0.1)
BASOPHILS NFR BLD: 0 % (ref 0–2)
BILIRUB DIRECT SERPL-MCNC: 0.2 MG/DL (ref 0–0.2)
BILIRUB SERPL-MCNC: 0.5 MG/DL (ref 0.2–1)
BILIRUB UR QL: NEGATIVE
BUN SERPL-MCNC: 34 MG/DL (ref 7–18)
BUN/CREAT SERPL: 28 (ref 12–20)
CALCIUM SERPL-MCNC: 9 MG/DL (ref 8.5–10.1)
CHLORIDE SERPL-SCNC: 109 MMOL/L (ref 100–111)
CO2 SERPL-SCNC: 21 MMOL/L (ref 21–32)
COLOR UR: YELLOW
CREAT SERPL-MCNC: 1.22 MG/DL (ref 0.6–1.3)
DIFFERENTIAL METHOD BLD: ABNORMAL
EOSINOPHIL # BLD: 0.2 K/UL (ref 0–0.4)
EOSINOPHIL NFR BLD: 2 % (ref 0–5)
EPITH CASTS URNS QL MICRO: ABNORMAL /LPF (ref 0–5)
ERYTHROCYTE [DISTWIDTH] IN BLOOD BY AUTOMATED COUNT: 18.4 % (ref 11.6–14.5)
FLUAV RNA SPEC QL NAA+PROBE: NOT DETECTED
FLUBV RNA SPEC QL NAA+PROBE: NOT DETECTED
GLOBULIN SER CALC-MCNC: 2.9 G/DL (ref 2–4)
GLUCOSE SERPL-MCNC: 143 MG/DL (ref 74–99)
GLUCOSE UR STRIP.AUTO-MCNC: NEGATIVE MG/DL
HCT VFR BLD AUTO: 38 % (ref 36–48)
HGB BLD-MCNC: 12.4 G/DL (ref 13–16)
HGB UR QL STRIP: NEGATIVE
IMM GRANULOCYTES # BLD AUTO: 0.1 K/UL (ref 0–0.04)
IMM GRANULOCYTES NFR BLD AUTO: 1 % (ref 0–0.5)
KETONES UR QL STRIP.AUTO: ABNORMAL MG/DL
LEUKOCYTE ESTERASE UR QL STRIP.AUTO: ABNORMAL
LYMPHOCYTES # BLD: 0.8 K/UL (ref 0.9–3.6)
LYMPHOCYTES NFR BLD: 9 % (ref 21–52)
MCH RBC QN AUTO: 27.4 PG (ref 24–34)
MCHC RBC AUTO-ENTMCNC: 32.6 G/DL (ref 31–37)
MCV RBC AUTO: 83.9 FL (ref 78–100)
MONOCYTES # BLD: 0.8 K/UL (ref 0.05–1.2)
MONOCYTES NFR BLD: 9 % (ref 3–10)
NEUTS SEG # BLD: 7.2 K/UL (ref 1.8–8)
NEUTS SEG NFR BLD: 80 % (ref 40–73)
NITRITE UR QL STRIP.AUTO: NEGATIVE
NRBC # BLD: 0 K/UL (ref 0–0.01)
NRBC BLD-RTO: 0 PER 100 WBC
PH UR STRIP: 5 (ref 5–8)
PLATELET # BLD AUTO: 263 K/UL (ref 135–420)
PMV BLD AUTO: 9.6 FL (ref 9.2–11.8)
POTASSIUM SERPL-SCNC: 3.6 MMOL/L (ref 3.5–5.5)
PROT SERPL-MCNC: 6.1 G/DL (ref 6.4–8.2)
PROT UR STRIP-MCNC: NEGATIVE MG/DL
RBC # BLD AUTO: 4.53 M/UL (ref 4.35–5.65)
RBC #/AREA URNS HPF: NEGATIVE /HPF (ref 0–5)
SARS-COV-2 RNA RESP QL NAA+PROBE: NOT DETECTED
SODIUM SERPL-SCNC: 136 MMOL/L (ref 136–145)
SOURCE: NORMAL
SP GR UR REFRACTOMETRY: 1.01 (ref 1–1.03)
T4 FREE SERPL-MCNC: 1.2 NG/DL (ref 0.7–1.5)
TROPONIN I SERPL HS-MCNC: 25 NG/L (ref 0–78)
TSH SERPL DL<=0.05 MIU/L-ACNC: 2.41 UIU/ML (ref 0.36–3.74)
UROBILINOGEN UR QL STRIP.AUTO: 0.2 EU/DL (ref 0.2–1)
WBC # BLD AUTO: 9 K/UL (ref 4.6–13.2)
WBC URNS QL MICRO: ABNORMAL /HPF (ref 0–4)

## 2024-09-20 PROCEDURE — 85025 COMPLETE CBC W/AUTO DIFF WBC: CPT

## 2024-09-20 PROCEDURE — 87636 SARSCOV2 & INF A&B AMP PRB: CPT

## 2024-09-20 PROCEDURE — 99285 EMERGENCY DEPT VISIT HI MDM: CPT

## 2024-09-20 PROCEDURE — 84439 ASSAY OF FREE THYROXINE: CPT

## 2024-09-20 PROCEDURE — 2580000003 HC RX 258: Performed by: PHYSICIAN ASSISTANT

## 2024-09-20 PROCEDURE — 71045 X-RAY EXAM CHEST 1 VIEW: CPT

## 2024-09-20 PROCEDURE — 93005 ELECTROCARDIOGRAM TRACING: CPT | Performed by: EMERGENCY MEDICINE

## 2024-09-20 PROCEDURE — 84484 ASSAY OF TROPONIN QUANT: CPT

## 2024-09-20 PROCEDURE — 81001 URINALYSIS AUTO W/SCOPE: CPT

## 2024-09-20 PROCEDURE — 80076 HEPATIC FUNCTION PANEL: CPT

## 2024-09-20 PROCEDURE — 84443 ASSAY THYROID STIM HORMONE: CPT

## 2024-09-20 PROCEDURE — 80048 BASIC METABOLIC PNL TOTAL CA: CPT

## 2024-09-20 RX ORDER — 0.9 % SODIUM CHLORIDE 0.9 %
1000 INTRAVENOUS SOLUTION INTRAVENOUS ONCE
Status: COMPLETED | OUTPATIENT
Start: 2024-09-20 | End: 2024-09-20

## 2024-09-20 RX ADMIN — SODIUM CHLORIDE 1000 ML: 9 INJECTION, SOLUTION INTRAVENOUS at 14:53

## 2024-09-20 ASSESSMENT — ENCOUNTER SYMPTOMS
NAUSEA: 1
COLOR CHANGE: 0
VOMITING: 0
RHINORRHEA: 0
DIARRHEA: 0
ABDOMINAL PAIN: 0
SHORTNESS OF BREATH: 0

## 2024-09-20 ASSESSMENT — PAIN - FUNCTIONAL ASSESSMENT: PAIN_FUNCTIONAL_ASSESSMENT: NONE - DENIES PAIN

## 2024-09-21 LAB
EKG ATRIAL RATE: 53 BPM
EKG DIAGNOSIS: NORMAL
EKG P AXIS: 8 DEGREES
EKG P-R INTERVAL: 156 MS
EKG Q-T INTERVAL: 440 MS
EKG QRS DURATION: 90 MS
EKG QTC CALCULATION (BAZETT): 412 MS
EKG R AXIS: -40 DEGREES
EKG T AXIS: 54 DEGREES
EKG VENTRICULAR RATE: 53 BPM

## 2024-09-21 PROCEDURE — 93010 ELECTROCARDIOGRAM REPORT: CPT | Performed by: INTERNAL MEDICINE

## 2024-11-15 ENCOUNTER — HOSPITAL ENCOUNTER (OUTPATIENT)
Facility: HOSPITAL | Age: 79
Discharge: HOME | End: 2024-11-18
Attending: INTERNAL MEDICINE

## 2024-11-15 RX ORDER — POTASSIUM CHLORIDE 1500 MG/1
20 TABLET, EXTENDED RELEASE ORAL DAILY
Qty: 30 TABLET | Refills: 5 | Status: SHIPPED | OUTPATIENT
Start: 2024-11-15

## 2024-11-18 ENCOUNTER — HOSPITAL ENCOUNTER (OUTPATIENT)
Facility: HOSPITAL | Age: 79
Discharge: HOME | End: 2024-11-21
Attending: INTERNAL MEDICINE
Payer: COMMERCIAL

## 2024-11-18 LAB — CREAT UR-MCNC: 1.2 MG/DL (ref 0.6–1.3)

## 2024-11-18 PROCEDURE — A9577 INJ MULTIHANCE: HCPCS | Performed by: INTERNAL MEDICINE

## 2024-11-18 PROCEDURE — 82565 ASSAY OF CREATININE: CPT

## 2024-11-18 PROCEDURE — 74183 MRI ABD W/O CNTR FLWD CNTR: CPT

## 2024-11-18 PROCEDURE — 6360000004 HC RX CONTRAST MEDICATION: Performed by: INTERNAL MEDICINE

## 2024-11-18 RX ADMIN — GADOBENATE DIMEGLUMINE 12 ML: 529 INJECTION, SOLUTION INTRAVENOUS at 10:10

## 2025-04-01 ENCOUNTER — OFFICE VISIT (OUTPATIENT)
Age: 80
End: 2025-04-01
Payer: COMMERCIAL

## 2025-04-01 ENCOUNTER — HOSPITAL ENCOUNTER (OUTPATIENT)
Facility: HOSPITAL | Age: 80
Discharge: HOME | End: 2025-04-04
Payer: COMMERCIAL

## 2025-04-01 VITALS
HEIGHT: 69 IN | OXYGEN SATURATION: 99 % | DIASTOLIC BLOOD PRESSURE: 64 MMHG | BODY MASS INDEX: 22.07 KG/M2 | HEART RATE: 48 BPM | SYSTOLIC BLOOD PRESSURE: 128 MMHG | WEIGHT: 149 LBS

## 2025-04-01 DIAGNOSIS — R06.02 SOB (SHORTNESS OF BREATH): ICD-10-CM

## 2025-04-01 DIAGNOSIS — I25.10 CORONARY ARTERY DISEASE, UNSPECIFIED VESSEL OR LESION TYPE, UNSPECIFIED WHETHER ANGINA PRESENT, UNSPECIFIED WHETHER NATIVE OR TRANSPLANTED HEART: ICD-10-CM

## 2025-04-01 DIAGNOSIS — I25.10 CORONARY ARTERY DISEASE, UNSPECIFIED VESSEL OR LESION TYPE, UNSPECIFIED WHETHER ANGINA PRESENT, UNSPECIFIED WHETHER NATIVE OR TRANSPLANTED HEART: Primary | ICD-10-CM

## 2025-04-01 DIAGNOSIS — I21.4 NSTEMI (NON-ST ELEVATED MYOCARDIAL INFARCTION) (HCC): ICD-10-CM

## 2025-04-01 DIAGNOSIS — I10 ESSENTIAL (PRIMARY) HYPERTENSION: ICD-10-CM

## 2025-04-01 LAB
ANION GAP SERPL CALC-SCNC: 6 MMOL/L (ref 3–18)
BUN SERPL-MCNC: 17 MG/DL (ref 7–18)
BUN/CREAT SERPL: 11 (ref 12–20)
CALCIUM SERPL-MCNC: 8.6 MG/DL (ref 8.5–10.1)
CHLORIDE SERPL-SCNC: 110 MMOL/L (ref 100–111)
CO2 SERPL-SCNC: 23 MMOL/L (ref 21–32)
CREAT SERPL-MCNC: 1.48 MG/DL (ref 0.6–1.3)
GLUCOSE SERPL-MCNC: 77 MG/DL (ref 74–99)
MAGNESIUM SERPL-MCNC: 2.4 MG/DL (ref 1.6–2.6)
POTASSIUM SERPL-SCNC: 4.3 MMOL/L (ref 3.5–5.5)
SODIUM SERPL-SCNC: 139 MMOL/L (ref 136–145)

## 2025-04-01 PROCEDURE — 93000 ELECTROCARDIOGRAM COMPLETE: CPT | Performed by: INTERNAL MEDICINE

## 2025-04-01 PROCEDURE — 3078F DIAST BP <80 MM HG: CPT | Performed by: INTERNAL MEDICINE

## 2025-04-01 PROCEDURE — 83735 ASSAY OF MAGNESIUM: CPT

## 2025-04-01 PROCEDURE — 36415 COLL VENOUS BLD VENIPUNCTURE: CPT

## 2025-04-01 PROCEDURE — 80048 BASIC METABOLIC PNL TOTAL CA: CPT

## 2025-04-01 PROCEDURE — 1123F ACP DISCUSS/DSCN MKR DOCD: CPT | Performed by: INTERNAL MEDICINE

## 2025-04-01 PROCEDURE — 3074F SYST BP LT 130 MM HG: CPT | Performed by: INTERNAL MEDICINE

## 2025-04-01 PROCEDURE — 99214 OFFICE O/P EST MOD 30 MIN: CPT | Performed by: INTERNAL MEDICINE

## 2025-04-01 ASSESSMENT — PATIENT HEALTH QUESTIONNAIRE - PHQ9
5. POOR APPETITE OR OVEREATING: NOT AT ALL
1. LITTLE INTEREST OR PLEASURE IN DOING THINGS: NOT AT ALL
7. TROUBLE CONCENTRATING ON THINGS, SUCH AS READING THE NEWSPAPER OR WATCHING TELEVISION: NOT AT ALL
3. TROUBLE FALLING OR STAYING ASLEEP: NOT AT ALL
2. FEELING DOWN, DEPRESSED OR HOPELESS: NOT AT ALL
SUM OF ALL RESPONSES TO PHQ QUESTIONS 1-9: 0
6. FEELING BAD ABOUT YOURSELF - OR THAT YOU ARE A FAILURE OR HAVE LET YOURSELF OR YOUR FAMILY DOWN: NOT AT ALL
9. THOUGHTS THAT YOU WOULD BE BETTER OFF DEAD, OR OF HURTING YOURSELF: NOT AT ALL
8. MOVING OR SPEAKING SO SLOWLY THAT OTHER PEOPLE COULD HAVE NOTICED. OR THE OPPOSITE, BEING SO FIGETY OR RESTLESS THAT YOU HAVE BEEN MOVING AROUND A LOT MORE THAN USUAL: NOT AT ALL
SUM OF ALL RESPONSES TO PHQ QUESTIONS 1-9: 0
SUM OF ALL RESPONSES TO PHQ QUESTIONS 1-9: 0
4. FEELING TIRED OR HAVING LITTLE ENERGY: NOT AT ALL
SUM OF ALL RESPONSES TO PHQ QUESTIONS 1-9: 0
10. IF YOU CHECKED OFF ANY PROBLEMS, HOW DIFFICULT HAVE THESE PROBLEMS MADE IT FOR YOU TO DO YOUR WORK, TAKE CARE OF THINGS AT HOME, OR GET ALONG WITH OTHER PEOPLE: NOT DIFFICULT AT ALL

## 2025-04-01 NOTE — PROGRESS NOTES
Salvador Dumont presents today for   Chief Complaint   Patient presents with    Follow-up       Salvador Dumont preferred language for health care discussion is english/other.    Is someone accompanying this pt? no    Is the patient using any DME equipment during OV? no    Depression Screening:  Depression: Not at risk (4/1/2025)    PHQ-2     PHQ-2 Score: 0        Learning Assessment:  Who is the primary learner? Patient    What is the preferred language for health care of the primary learner? ENGLISH    How does the primary learner prefer to learn new concepts? DEMONSTRATION    Answered By patient    Relationship to Learner SELF           Pt currently taking Anticoagulant therapy? no    Pt currently taking Antiplatelet therapy ? Aspirin  brilinita      Coordination of Care:  1. Have you been to the ER, urgent care clinic since your last visit? Hospitalized since your last visit? no    2. Have you seen or consulted any other health care providers outside of the [facility] since your last visit? Include any pap smears or colon screening. no

## 2025-04-01 NOTE — PROGRESS NOTES
HISTORY OF PRESENT ILLNESS  Salvador Dumont  79 y.o. male     Chief Complaint   Patient presents with    Follow-up       ASSESSMENT and PLAN    The primary encounter diagnosis was Coronary artery disease, unspecified vessel or lesion type, unspecified whether angina present, unspecified whether native or transplanted heart. Diagnoses of SOB (shortness of breath), NSTEMI (non-ST elevated myocardial infarction) ([facility]), and Essential (primary) hypertension were also pertinent to this visit.    Mr. Salvador Dumont has known history of CAD.  Back in January 2024, he presented to the the emergency room with nonradiating chest pain.  His subsequent evaluation by echocardiogram revealed EF 50-55% with anterior regional wall motion abnormality and moderate MR.  He had long proximal to mid LAD 90-95% stenosis stented to residual 0% using 2.75 mm ANA.  He had a large OM long ostial 80% which was staged for April 2024 with successful 3.25 mm ANA.  He denies tobacco use or alcohol use.  He has history hypertension as well as dyslipidemia on Lipitor.  He is nuclear scan in May 2024 showed EF 60% with normal perfusion.  His echocardiogram showed EF 55-60% with mild MR and RVSP 28 mmHg.  In late 2024, he started having some psychiatric issues.  In 2025, he is seeing a therapist and taking medications.    Medication regimen reviewed and current cardiac regimen will be continued.  All new testing since the last office visit was independently reviewed by me.    CAD:    Clinically stable.  He has not had any recurrent chest pains.  HTN:    Well-controlled at 128/64.  Continue current BP regimen.  Rhythm:    Asymptomatic sinus bradycardia at 48 bpm.  His Toprol XL was discontinued.  CHF:    There is no evidence of decompensated CHF noted.  BMI:     His weight today is 149 pounds.  His baseline weight was 180 pounds.  I have encouraged him and his wife not to lose any further weight; ideally, he should weigh about 160 pounds for his

## 2025-04-02 ENCOUNTER — RESULTS FOLLOW-UP (OUTPATIENT)
Age: 80
End: 2025-04-02

## 2025-04-07 NOTE — TELEPHONE ENCOUNTER
----- Message from Dr. Kieth Real MD sent at 4/4/2025 10:43 AM EDT -----  His electrolytes look fine.  ----- Message -----  From: Xiomy Hall LPN  Sent: 4/2/2025   3:21 PM EDT  To: Keith Real MD    Per your last office note:    It is unclear why he is on potassium replacement.  With his significant weight loss, I will check SMA-7, and magnesium level.  He may need to discontinue his potassium supplements

## 2025-04-07 NOTE — TELEPHONE ENCOUNTER
Spoke with patient.   Verbal order and read back per Keith Real MD  His electrolytes look fine.    Patient did not have further questions or concerns. If he have future questions please give the office a call.

## 2025-07-26 ENCOUNTER — APPOINTMENT (OUTPATIENT)
Facility: HOSPITAL | Age: 80
End: 2025-07-26
Payer: COMMERCIAL

## 2025-07-26 ENCOUNTER — HOSPITAL ENCOUNTER (EMERGENCY)
Facility: HOSPITAL | Age: 80
Discharge: HOME | End: 2025-07-26
Attending: EMERGENCY MEDICINE
Payer: COMMERCIAL

## 2025-07-26 VITALS
RESPIRATION RATE: 14 BRPM | TEMPERATURE: 98.1 F | WEIGHT: 149 LBS | SYSTOLIC BLOOD PRESSURE: 168 MMHG | OXYGEN SATURATION: 100 % | DIASTOLIC BLOOD PRESSURE: 82 MMHG | HEIGHT: 69 IN | BODY MASS INDEX: 22.07 KG/M2 | HEART RATE: 44 BPM

## 2025-07-26 DIAGNOSIS — S91.109A OPEN TOE WOUND, INITIAL ENCOUNTER: Primary | ICD-10-CM

## 2025-07-26 LAB
ANION GAP SERPL CALC-SCNC: 11 MMOL/L (ref 3–18)
BASOPHILS # BLD: 0.04 K/UL (ref 0–0.1)
BASOPHILS NFR BLD: 0.4 % (ref 0–2)
BUN SERPL-MCNC: 25 MG/DL (ref 6–23)
BUN/CREAT SERPL: 11 (ref 12–20)
CALCIUM SERPL-MCNC: 9.1 MG/DL (ref 8.5–10.1)
CHLORIDE SERPL-SCNC: 109 MMOL/L (ref 98–107)
CO2 SERPL-SCNC: 17 MMOL/L (ref 21–32)
CREAT SERPL-MCNC: 2.23 MG/DL (ref 0.6–1.3)
DIFFERENTIAL METHOD BLD: ABNORMAL
EOSINOPHIL # BLD: 0.18 K/UL (ref 0–0.4)
EOSINOPHIL NFR BLD: 1.9 % (ref 0–5)
ERYTHROCYTE [DISTWIDTH] IN BLOOD BY AUTOMATED COUNT: 16.1 % (ref 11.6–14.5)
ERYTHROCYTE [SEDIMENTATION RATE] IN BLOOD: 30 MM/HR (ref 0–20)
GLUCOSE SERPL-MCNC: 149 MG/DL (ref 74–108)
HCT VFR BLD AUTO: 40.1 % (ref 36–48)
HGB BLD-MCNC: 12.3 G/DL (ref 13–16)
IMM GRANULOCYTES # BLD AUTO: 0.05 K/UL (ref 0–0.04)
IMM GRANULOCYTES NFR BLD AUTO: 0.5 % (ref 0–0.5)
LYMPHOCYTES # BLD: 1.23 K/UL (ref 0.9–3.6)
LYMPHOCYTES NFR BLD: 12.7 % (ref 21–52)
MCH RBC QN AUTO: 27.9 PG (ref 24–34)
MCHC RBC AUTO-ENTMCNC: 30.7 G/DL (ref 31–37)
MCV RBC AUTO: 90.9 FL (ref 78–100)
MONOCYTES # BLD: 0.78 K/UL (ref 0.05–1.2)
MONOCYTES NFR BLD: 8 % (ref 3–10)
NEUTS SEG # BLD: 7.43 K/UL (ref 1.8–8)
NEUTS SEG NFR BLD: 76.5 % (ref 40–73)
NRBC # BLD: 0 K/UL (ref 0–0.01)
NRBC BLD-RTO: 0 PER 100 WBC
PLATELET # BLD AUTO: 265 K/UL (ref 135–420)
PMV BLD AUTO: 9.3 FL (ref 9.2–11.8)
POTASSIUM SERPL-SCNC: 5 MMOL/L (ref 3.5–5.5)
RBC # BLD AUTO: 4.41 M/UL (ref 4.35–5.65)
SODIUM SERPL-SCNC: 138 MMOL/L (ref 136–145)
WBC # BLD AUTO: 9.7 K/UL (ref 4.6–13.2)

## 2025-07-26 PROCEDURE — 85652 RBC SED RATE AUTOMATED: CPT

## 2025-07-26 PROCEDURE — 87077 CULTURE AEROBIC IDENTIFY: CPT

## 2025-07-26 PROCEDURE — 93005 ELECTROCARDIOGRAM TRACING: CPT

## 2025-07-26 PROCEDURE — 99285 EMERGENCY DEPT VISIT HI MDM: CPT

## 2025-07-26 PROCEDURE — 85025 COMPLETE CBC W/AUTO DIFF WBC: CPT

## 2025-07-26 PROCEDURE — 6360000002 HC RX W HCPCS

## 2025-07-26 PROCEDURE — 96374 THER/PROPH/DIAG INJ IV PUSH: CPT

## 2025-07-26 PROCEDURE — 80048 BASIC METABOLIC PNL TOTAL CA: CPT

## 2025-07-26 PROCEDURE — 6370000000 HC RX 637 (ALT 250 FOR IP)

## 2025-07-26 PROCEDURE — 73660 X-RAY EXAM OF TOE(S): CPT

## 2025-07-26 PROCEDURE — 87205 SMEAR GRAM STAIN: CPT

## 2025-07-26 PROCEDURE — 87186 SC STD MICRODIL/AGAR DIL: CPT

## 2025-07-26 PROCEDURE — 2500000003 HC RX 250 WO HCPCS

## 2025-07-26 PROCEDURE — 87070 CULTURE OTHR SPECIMN AEROBIC: CPT

## 2025-07-26 RX ORDER — GABAPENTIN 600 MG/1
300 TABLET ORAL 3 TIMES DAILY
Status: DISCONTINUED | OUTPATIENT
Start: 2025-07-26 | End: 2025-07-26

## 2025-07-26 RX ORDER — DOXYCYCLINE HYCLATE 100 MG
100 TABLET ORAL 2 TIMES DAILY
Qty: 10 TABLET | Refills: 0 | Status: SHIPPED | OUTPATIENT
Start: 2025-07-26 | End: 2025-07-31

## 2025-07-26 RX ORDER — DOXYCYCLINE 100 MG/1
100 CAPSULE ORAL
Status: COMPLETED | OUTPATIENT
Start: 2025-07-26 | End: 2025-07-26

## 2025-07-26 RX ORDER — GABAPENTIN 300 MG/1
600 CAPSULE ORAL
Status: COMPLETED | OUTPATIENT
Start: 2025-07-26 | End: 2025-07-26

## 2025-07-26 RX ADMIN — DOXYCYCLINE HYCLATE 100 MG: 100 CAPSULE ORAL at 17:31

## 2025-07-26 RX ADMIN — CEFTRIAXONE 1000 MG: 1 INJECTION, POWDER, FOR SOLUTION INTRAMUSCULAR; INTRAVENOUS at 17:32

## 2025-07-26 RX ADMIN — GABAPENTIN 600 MG: 300 CAPSULE ORAL at 18:14

## 2025-07-26 ASSESSMENT — PAIN SCALES - GENERAL: PAINLEVEL_OUTOF10: 8

## 2025-07-26 ASSESSMENT — PAIN - FUNCTIONAL ASSESSMENT: PAIN_FUNCTIONAL_ASSESSMENT: 0-10

## 2025-07-26 ASSESSMENT — ENCOUNTER SYMPTOMS
NAUSEA: 0
VOMITING: 0
ABDOMINAL PAIN: 0
BACK PAIN: 0
SHORTNESS OF BREATH: 0
DIARRHEA: 0

## 2025-07-26 NOTE — ED PROVIDER NOTES
EMERGENCY DEPARTMENT HISTORY AND PHYSICAL EXAM    5:59 PM      Date: 7/26/2025  Patient Name: Salvador Dumont  [id number]  Chief Complaint   Patient presents with    Toe Pain         Medical Decision Making   I am the first provider for this patient. I reviewed the vital signs, available nursing notes, past medical history, past surgical history, family history and social history.    Salvador Dumont is a 79 y.o. male who  has a past medical history of BPH (benign prostatic hyperplasia), CAD (coronary artery disease), Depression, Diabetes (HCC), Hypertension, Kidney disease, Kidney stone, Neuropathy, and GATITO (obstructive sleep apnea)..  Salvador Dumont is a 79 y.o. male presenting with 1 week of [facility]  Patient has wound on bottom of left second digit with some granulation tissue what appears to be dried blood.  Patient has known neuropathy and says that he was not aware of any injury to the toe until being seen earlier in their clinic.  Thick white material expressed from wound.  Wound culture ordered the results in several days.  Patient has been seen by Dr. Iverson in the past.  [facility] sent to make them aware he reports they can see the patient Monday at 9:30 AM.  Patient given Rocephin 1 g IV and will be discharged with doxycycline 100 twice daily for 5 days.  Wound culture collected from discharge from the toe wound. CBC without white count X-ray ordered to rule out osteomyelitis patient left AMA prior to results.  Proper paperwork signed patient endorses understanding risks of refusing admission and leaving.  Doxycycline 100 mg twice daily for 5 days sent to patient's pharmacy at their request.  Patient received IV Rocephin and Vibramycin prior to discharge but declines fluids asking to have IVs removed.        ER Course including (if applicable) independent interpretations of EKG/imaging and conversations with consultants:  ED Course as of 07/26/25 1759   Sat Jul 26, 2025   [id number]

## 2025-07-26 NOTE — ED TRIAGE NOTES
Patient arrives to ED with report of toe pain to left foot, states [facility] was at [facility] and they were concerned his BP was low, patient hypertensive here.

## 2025-07-27 LAB
BACTERIA SPEC CULT: ABNORMAL
BACTERIA SPEC CULT: ABNORMAL
EKG ATRIAL RATE: 49 BPM
EKG DIAGNOSIS: NORMAL
EKG P AXIS: 11 DEGREES
EKG P-R INTERVAL: 162 MS
EKG Q-T INTERVAL: 438 MS
EKG QRS DURATION: 86 MS
EKG QTC CALCULATION (BAZETT): 395 MS
EKG R AXIS: -26 DEGREES
EKG T AXIS: 62 DEGREES
EKG VENTRICULAR RATE: 49 BPM
GRAM STN SPEC: ABNORMAL
GRAM STN SPEC: ABNORMAL
SERVICE CMNT-IMP: ABNORMAL

## 2025-07-27 PROCEDURE — 93010 ELECTROCARDIOGRAM REPORT: CPT | Performed by: INTERNAL MEDICINE

## 2025-07-29 LAB
BACTERIA SPEC CULT: ABNORMAL
GRAM STN SPEC: ABNORMAL
GRAM STN SPEC: ABNORMAL
SERVICE CMNT-IMP: ABNORMAL

## (undated) DEVICE — AGENT HEMOSTATIC DRY TOP D-STAT

## (undated) DEVICE — TR BAND RADIAL ARTERY COMPRESSION DEVICE: Brand: TR BAND

## (undated) DEVICE — PRESSURE MONITORING SET: Brand: TRUWAVE

## (undated) DEVICE — CATH BLLN ANGIO 2.50X25MM SC EUPHORA RX

## (undated) DEVICE — RADIFOCUS OPTITORQUE ANGIOGRAPHIC CATHETER: Brand: OPTITORQUE

## (undated) DEVICE — INDEFLATOR PLUS 30 INFLATION DEVICE 30 ATM 10 CC: Brand: INDEFLATOR

## (undated) DEVICE — Device: Brand: EAGLE EYE PLATINUM RX DIGITAL IVUS CATHETER

## (undated) DEVICE — PACK PROCEDURE SURG VASC CATH 161 MMC LF

## (undated) DEVICE — CATH BLLN ANGIO 2.75X12MM NC EUPHORIA RX

## (undated) DEVICE — CATH BLLN ANGIO 3X20MM SC EUPHORA RX

## (undated) DEVICE — HI-TORQUE BALANCE GUIDE WIRE W/HYDROCOAT .014 STRAIGHT TIP 3.0 CM X 190 CM: Brand: HI-TORQUE BALANCE

## (undated) DEVICE — GUIDEWIRE VASC L260CM DIA0035IN TIP L3MM PTFE J STD TAPR FIX

## (undated) DEVICE — CATHETER GUID EBU3.5 6 FRX100 CM VISTA BRT TIP

## (undated) DEVICE — COPILOT BLEEDBACK CONTROL VALVE: Brand: COPILOT

## (undated) DEVICE — SET FLD ADMIN 3 W STPCOCK FIX FEM L BOR 1IN

## (undated) DEVICE — DRAPE,ANGIO,BRACH,STERILE,38X44: Brand: MEDLINE

## (undated) DEVICE — SHEATH RAIN RAD INTRO SHTH W/ BARE WIRE 10 CM 506610S

## (undated) DEVICE — SYRINGE ANGIO 8ML COR CTRL ROT ADPT SLD PLUNG CLR BRL M

## (undated) DEVICE — 20/30 INDEFLATOR INFLATION DEVICE 30 ATM 20 CC: Brand: INDEFLATOR

## (undated) DEVICE — CATH LAB PACK: Brand: MEDLINE INDUSTRIES, INC.

## (undated) DEVICE — GLIDESHEATH SLENDER STAINLESS STEEL KIT: Brand: GLIDESHEATH SLENDER

## (undated) DEVICE — PROCEDURE KIT FLUID MGMT 10 FR CUST MAINFOLD

## (undated) DEVICE — CATHETER ANGIO JR4 STD 0.038 IN 6 FRX100 CM SUPER TORQUE + ORDER MULT OF 5 EACH